# Patient Record
Sex: FEMALE | Race: WHITE | NOT HISPANIC OR LATINO | Employment: UNEMPLOYED | URBAN - METROPOLITAN AREA
[De-identification: names, ages, dates, MRNs, and addresses within clinical notes are randomized per-mention and may not be internally consistent; named-entity substitution may affect disease eponyms.]

---

## 2017-01-10 ENCOUNTER — TELEPHONE (OUTPATIENT)
Dept: FAMILY MEDICINE | Facility: CLINIC | Age: 47
End: 2017-01-10

## 2017-01-10 NOTE — TELEPHONE ENCOUNTER
Prior Authorization    Drug: Cymbalta 30mf (ndc- [address]-44)  [facility]: [facility]  Id#: [id number]  Phone#: [phone]  Group: [id number]    DRUG REQUIRES PRIOR AUTHORIZATION    [place] [place]  [facility] [place] [id number]  Phone :[phone]  Fax: [phone]

## 2017-01-13 NOTE — TELEPHONE ENCOUNTER
Fax received from insurance that PA is not required. Spoke with Pharmacy and ran claim and it went through

## 2017-02-17 ENCOUNTER — TELEPHONE (OUTPATIENT)
Dept: FAMILY MEDICINE | Facility: CLINIC | Age: 47
End: 2017-02-17

## 2017-02-17 NOTE — TELEPHONE ENCOUNTER
Spoke with pt and she said she does not want to be on DULoxetine (CYMBALTA) anymore she wants to go back on Zoloft only.    Please advise.  Lynda Rajput RN

## 2017-02-20 NOTE — TELEPHONE ENCOUNTER
Is she on 30mg or 60mg currently. If she did go ahead and increase to the 60mg I'd like her to decrease the dose to 30mg daily x1 week then take 30mg every other day x1 week then stop.

## 2017-02-20 NOTE — TELEPHONE ENCOUNTER
She can restart it now if not currently taking: take a half tab daily x1 week then increase to 1 tab daily x1 week then increase to 1.5 tab daily

## 2017-02-20 NOTE — TELEPHONE ENCOUNTER
Spoke with Joellen and gave her the below medication instructions. She verbalized understanding and agreement.  Lynda Rajput RN

## 2017-02-20 NOTE — TELEPHONE ENCOUNTER
Gave pt the below instructions on medication changes, she verbalized understanding and agreement.   When she is done with that can she just go back on the Zoloft that is currently ordered?   sertraline (ZOLOFT) 100 MG tablet 135 tablet 3 11/17/2016  No      Sig: Take 1.5 tablets (150 mg) by mouth daily   She still has refills remaining at her pharmacy.   Lynda Rajput RN

## 2017-02-21 DIAGNOSIS — F41.8 DEPRESSION WITH ANXIETY: ICD-10-CM

## 2017-02-21 RX ORDER — CLONAZEPAM 0.5 MG/1
0.5 TABLET ORAL 2 TIMES DAILY PRN
Qty: 60 TABLET | Refills: 1 | Status: SHIPPED | OUTPATIENT
Start: 2017-02-21 | End: 2017-06-05

## 2017-02-21 NOTE — TELEPHONE ENCOUNTER
Clonazepam 0.5mg      Last Written Prescription Date:  09/27/16  Last Fill Quantity: 60,   # refills: 1  Last Office Visit with [facility] prescribing provider: 11/17/16  Future Office visit:       Routing refill request to provider for review/approval because:  Drug not on the [facility] refill protocol or controlled substance    On behalf of [facility]  [facility]

## 2017-06-05 ENCOUNTER — OFFICE VISIT (OUTPATIENT)
Dept: FAMILY MEDICINE | Facility: CLINIC | Age: 47
End: 2017-06-05
Payer: COMMERCIAL

## 2017-06-05 VITALS
SYSTOLIC BLOOD PRESSURE: 128 MMHG | OXYGEN SATURATION: 98 % | HEART RATE: 67 BPM | DIASTOLIC BLOOD PRESSURE: 76 MMHG | WEIGHT: 163.2 LBS | HEIGHT: 66 IN | BODY MASS INDEX: 26.23 KG/M2 | TEMPERATURE: 98.3 F

## 2017-06-05 DIAGNOSIS — F31.9 BIPOLAR AFFECTIVE DISORDER, REMISSION STATUS UNSPECIFIED (H): ICD-10-CM

## 2017-06-05 DIAGNOSIS — I10 ESSENTIAL HYPERTENSION: ICD-10-CM

## 2017-06-05 DIAGNOSIS — F41.8 DEPRESSION WITH ANXIETY: Primary | ICD-10-CM

## 2017-06-05 DIAGNOSIS — E03.4 HYPOTHYROIDISM DUE TO ACQUIRED ATROPHY OF THYROID: ICD-10-CM

## 2017-06-05 PROCEDURE — 99214 OFFICE O/P EST MOD 30 MIN: CPT | Performed by: PHYSICIAN ASSISTANT

## 2017-06-05 RX ORDER — CLONAZEPAM 0.5 MG/1
0.5 TABLET ORAL 2 TIMES DAILY PRN
Qty: 60 TABLET | Refills: 1 | Status: SHIPPED | OUTPATIENT
Start: 2017-06-05 | End: 2017-11-29

## 2017-06-05 RX ORDER — SERTRALINE HYDROCHLORIDE 100 MG/1
150 TABLET, FILM COATED ORAL DAILY
Qty: 135 TABLET | Refills: 3 | Status: SHIPPED | OUTPATIENT
Start: 2017-06-05 | End: 2018-06-22

## 2017-06-05 ASSESSMENT — ANXIETY QUESTIONNAIRES
6. BECOMING EASILY ANNOYED OR IRRITABLE: NOT AT ALL
GAD7 TOTAL SCORE: 0
2. NOT BEING ABLE TO STOP OR CONTROL WORRYING: NOT AT ALL
3. WORRYING TOO MUCH ABOUT DIFFERENT THINGS: NOT AT ALL
1. FEELING NERVOUS, ANXIOUS, OR ON EDGE: NOT AT ALL
IF YOU CHECKED OFF ANY PROBLEMS ON THIS QUESTIONNAIRE, HOW DIFFICULT HAVE THESE PROBLEMS MADE IT FOR YOU TO DO YOUR WORK, TAKE CARE OF THINGS AT HOME, OR GET ALONG WITH OTHER PEOPLE: NOT DIFFICULT AT ALL
7. FEELING AFRAID AS IF SOMETHING AWFUL MIGHT HAPPEN: NOT AT ALL
5. BEING SO RESTLESS THAT IT IS HARD TO SIT STILL: NOT AT ALL

## 2017-06-05 ASSESSMENT — PATIENT HEALTH QUESTIONNAIRE - PHQ9: 5. POOR APPETITE OR OVEREATING: NOT AT ALL

## 2017-06-05 NOTE — NURSING NOTE
"Chief Complaint   Patient presents with     Recheck Medication       Initial /89  Pulse 67  Temp 98.3  F (36.8  C) (Oral)  Ht 5' 6\" (1.676 m)  Wt 163 lb 3.2 oz (74 kg)  LMP 07/20/2008  SpO2 98%  BMI 26.34 kg/m2 Estimated body mass index is 26.34 kg/(m^2) as calculated from the following:    Height as of this encounter: 5' 6\" (1.676 m).    Weight as of this encounter: 163 lb 3.2 oz (74 kg).  Medication Reconciliation: complete   Lorena Vega MA      "

## 2017-06-05 NOTE — PATIENT INSTRUCTIONS
VICTOR MI: VEENA WILL BE OUT OF THE CLINIC FROM [place] 15 THROUGH JULY 4.  Any calls/Mychart messages, refill requests, and urgent lab results will be forwarded to her colleagues in her absence.

## 2017-06-05 NOTE — MR AVS SNAPSHOT
"              After Visit Summary   2017    Joellen Richter    MRN: [id number]           Patient Information     Date Of Birth          1970        Visit Information        Provider Department      2017 5:20 PM [facility]        Today's Diagnoses     Depression with anxiety    -  1      Care Instructions    FYI: NIKKI WILL BE OUT OF THE CLINIC FROM [place] 15 THROUGH .  Any calls/Mychart messages, refill requests, and urgent lab results will be forwarded to her colleagues in her absence.             Follow-ups after your visit        Who to contact     Normal or non-critical lab and imaging results will be communicated to you by MyChart, letter or phone within 4 business days after the clinic has received the results. If you do not hear from us within 7 days, please contact the clinic through [employer] Cloud[employer] or phone. If you have a critical or abnormal lab result, we will notify you by phone as soon as possible.  Submit refill requests through [employer] or call your pharmacy and they will forward the refill request to us. Please allow 3 business days for your refill to be completed.          If you need to speak with a  for additional information , please call: [phone]             Additional Information About Your Visit        MyCharUrbster Information     [employer] lets you send messages to your doctor, view your test results, renew your prescriptions, schedule appointments and more. To sign up, go to [email or web address] . Click on \"Log in\" on the left side of the screen, which will take you to the Welcome page. Then click on \"Sign up Now\" on the right side of the page.     You will be asked to enter the access code listed below, as well as some personal information. Please follow the directions to create your username and password.     Your access code is: [email or web address]  Expires: 9/3/2017  5:40 PM     Your access code will  in 90 days. If you " "need help or a new code, please call your [facility] clinic or [phone].        Care EveryWhere ID     This is your Care EveryWhere ID. This could be used by other organizations to access your [facility] medical records  [id number]        Your Vitals Were     Pulse Temperature Height Last Period Pulse Oximetry BMI (Body Mass Index)    67 98.3  F (36.8  C) (Oral) 5' 6\" (1.676 m) 07/20/2008 98% 26.34 kg/m2       Blood Pressure from Last 3 Encounters:   06/05/17 145/89   11/17/16 110/77   03/14/16 121/68    Weight from Last 3 Encounters:   06/05/17 163 lb 3.2 oz (74 kg)   11/17/16 163 lb 6.4 oz (74.1 kg)   03/14/16 172 lb 9.6 oz (78.3 kg)              Today, you had the following     No orders found for display         Today's Medication Changes          These changes are accurate as of: 6/5/17  5:40 PM.  If you have any questions, ask your nurse or doctor.               Stop taking these medicines if you haven't already. Please contact your care team if you have questions.     levothyroxine 75 MCG tablet   Commonly known as:  SYNTHROID/LEVOTHROID   Stopped by:  Nikki Castro PA-C           omeprazole 40 MG capsule   Commonly known as:  priLOSEC   Stopped by:  Nikki Castro PA-C           ondansetron 4 MG tablet   Commonly known as:  ZOFRAN   Stopped by:  Nikki Castro PA-C                Where to get your medicines      These medications were sent to [facility] [place] [address] [address] [address]     Phone:  [phone]     sertraline 100 MG tablet         Some of these will need a paper prescription and others can be bought over the counter.  Ask your nurse if you have questions.     Bring a paper prescription for each of these medications     clonazePAM 0.5 MG tablet                Primary Care Provider Office Phone # Fax #    Nikki Castro PA-C [phone]       [address] [place] [address] [place] [address]        Thank you!     Thank you for choosing [facility]  for your care. Our goal is always to provide you with excellent care. Hearing back from our patients is one way we can continue to improve our services. Please take a few minutes to complete the written survey that you may receive in the mail after your visit with us. Thank you!             Your Updated Medication List - Protect others around you: Learn how to safely use, store and throw away your medicines at www.disposemymeds.org.          This list is accurate as of: 6/5/17  5:40 PM.  Always use your most recent med list.                   Brand Name Dispense Instructions for use    clonazePAM 0.5 MG tablet    klonoPIN    60 tablet    Take 1 tablet (0.5 mg) by mouth 2 times daily as needed for anxiety ; keep use to a minimum, this is a 3 month supply       sertraline 100 MG tablet    ZOLOFT    135 tablet    Take 1.5 tablets (150 mg) by mouth daily

## 2017-06-05 NOTE — PROGRESS NOTES
SUBJECTIVE:                                                    Joellen Richter is a 47 year old female who presents to clinic today for the following health issues:    Thyroid f/u  Hasn't been taking thyroid medication for months due to GI upset   Having all classic symptoms - weight gain, dry skin, loose stools     Depression Followup    Status since last visit: Stable     See PHQ-9 for current symptoms.  Other associated symptoms: None    Complicating factors:   Significant life event:  No   Current substance abuse:  None  Anxiety or Panic symptoms:  No    Doing well on Zoloft and clonazepam  Does not feel the need to make any changes      PHQ-9  English PHQ-9   Any Language          Amount of exercise or physical activity: None     Problems taking medications regularly: No    Medication side effects: none    Diet: regular (no restrictions)      Problem list and histories reviewed & adjusted, as indicated.  Additional history: as documented    Patient Active Problem List   Diagnosis     Tobacco abuse     Bipolar affective disorder (H)     Hypertension     Hyperlipidemia with target LDL less than 130     Hypothyroidism     Depression with anxiety     Past Surgical History:   Procedure Laterality Date     C LAPAROSCOPY, SURGICAL; W/ VAGINAL HYSTERECTOMY W/WO REMOVAL OVARY(S)/TUBES  8/15/08     C/SECTION, CLASSICAL      3 , Classical     HYSTERECTOMY       TUBAL LIGATION         Social History   Substance Use Topics     Smoking status: Current Every Day Smoker     Packs/day: 0.50     Years: 10.00     Types: Cigarettes     Smokeless tobacco: Never Used      Comment: down to 8 on 11/17/10 - states is 7/day now 11     Alcohol use Yes      Comment:  3-4 beers per week- quit drinking in 2011     Family History   Problem Relation Age of Onset     CANCER Mother      liver, ETOH     CANCER Sister      UTERINE CANCER at 37yo     DIABETES Father      adult     Hypertension Father      Obesity Father       "CANCER Paternal Grandmother      blood     DIABETES Paternal Grandmother      CANCER Paternal Grandfather      lung     Hypertension Mother      Hypertension Paternal Grandmother      Hypertension Paternal Grandfather      CEREBROVASCULAR DISEASE Father      Arthritis Father      rheumatoid     Depression Father      Depression Sister      Depression Mother      Depression Daughter      Eye Disorder Father      DM     Lipids Father      Obesity Paternal Grandmother      OSTEOPOROSIS Father            Reviewed and updated as needed this visit by clinical staff  Tobacco  Allergies  Meds  Med Hx  Surg Hx  Fam Hx  Soc Hx      Reviewed and updated as needed this visit by Provider         ROS:  Constitutional, endocrine, cardiac, and psychiatric systems are negative, except as otherwise noted.    OBJECTIVE:                                                    /89  Pulse 67  Temp 98.3  F (36.8  C) (Oral)  Ht 5' 6\" (1.676 m)  Wt 163 lb 3.2 oz (74 kg)  LMP 07/20/2008  SpO2 98%  BMI 26.34 kg/m2  Body mass index is 26.34 kg/(m^2).  Constitutional: healthy, alert, active, no distress.    Head: Normocephalic  Musculoskeletal: extremities normal- no gross deformities noted, gait normal, normal muscle tone and able to move about the exam room without difficulty.    Skin: no suspicious lesions or rashes appreciated on exposed areas  Neurologic: Gait normal. Moving all extremities spontaneously, no apparent weakness.    Psychiatric: mentation appears normal, thoughts are clear and concise. Converses appropriately. Affect is Appropriate/mood-congruent       ASSESSMENT:                                                      1. Depression with anxiety    2. Bipolar affective disorder, remission status unspecified (H)    3. Essential hypertension    4. Hypothyroidism due to acquired atrophy of thyroid         PLAN:                                                    Zoloft and clonazepam renewed, no changes.    Patient " does not want to treat her high blood pressure or hypothyroidism. We discussed some of the risks and she verbalized understanding of those.     Patient Instructions   FYI: VEENA WILL BE OUT OF THE CLINIC FROM [place] 15 THROUGH JULY 4.  Any calls/Mychart messages, refill requests, and urgent lab results will be forwarded to her colleagues in her absence.        The patient was in agreement with the plan today and had no questions or concerns prior to leaving the clinic.     Veena Castro PA-C  [facility]

## 2017-06-06 ASSESSMENT — ANXIETY QUESTIONNAIRES: GAD7 TOTAL SCORE: 0

## 2017-06-06 ASSESSMENT — PATIENT HEALTH QUESTIONNAIRE - PHQ9: SUM OF ALL RESPONSES TO PHQ QUESTIONS 1-9: 0

## 2017-07-10 ENCOUNTER — TELEPHONE (OUTPATIENT)
Dept: FAMILY MEDICINE | Facility: CLINIC | Age: 47
End: 2017-07-10

## 2017-07-10 DIAGNOSIS — F51.02 ADJUSTMENT INSOMNIA: Primary | ICD-10-CM

## 2017-07-10 RX ORDER — TRAZODONE HYDROCHLORIDE 50 MG/1
50-150 TABLET, FILM COATED ORAL
Qty: 90 TABLET | Refills: 1 | Status: SHIPPED | OUTPATIENT
Start: 2017-07-10 | End: 2017-12-06

## 2017-09-08 ENCOUNTER — TELEPHONE (OUTPATIENT)
Dept: FAMILY MEDICINE | Facility: CLINIC | Age: 47
End: 2017-09-08

## 2017-09-08 DIAGNOSIS — Z12.11 COLON CANCER SCREENING: Primary | ICD-10-CM

## 2017-09-18 ENCOUNTER — OFFICE VISIT (OUTPATIENT)
Dept: FAMILY MEDICINE | Facility: CLINIC | Age: 47
End: 2017-09-18
Payer: COMMERCIAL

## 2017-09-18 VITALS
SYSTOLIC BLOOD PRESSURE: 123 MMHG | BODY MASS INDEX: 26.68 KG/M2 | DIASTOLIC BLOOD PRESSURE: 71 MMHG | RESPIRATION RATE: 16 BRPM | HEART RATE: 73 BPM | HEIGHT: 66 IN | TEMPERATURE: 98.3 F | WEIGHT: 166 LBS

## 2017-09-18 DIAGNOSIS — E03.4 HYPOTHYROIDISM DUE TO ACQUIRED ATROPHY OF THYROID: Primary | ICD-10-CM

## 2017-09-18 PROCEDURE — 84481 FREE ASSAY (FT-3): CPT | Performed by: PHYSICIAN ASSISTANT

## 2017-09-18 PROCEDURE — 99213 OFFICE O/P EST LOW 20 MIN: CPT | Performed by: PHYSICIAN ASSISTANT

## 2017-09-18 PROCEDURE — 84443 ASSAY THYROID STIM HORMONE: CPT | Performed by: PHYSICIAN ASSISTANT

## 2017-09-18 PROCEDURE — 84439 ASSAY OF FREE THYROXINE: CPT | Performed by: PHYSICIAN ASSISTANT

## 2017-09-18 PROCEDURE — 36415 COLL VENOUS BLD VENIPUNCTURE: CPT | Performed by: PHYSICIAN ASSISTANT

## 2017-09-18 NOTE — PROGRESS NOTES
SUBJECTIVE:   Joellen Richter is a 47 year old female who presents to clinic today for the following health issues:      Hypothyroidism Follow-up      Since last visit, patient describes the following symptoms: weight gain of 30 lbs, constipation, fatigue and hair loss      Amount of exercise or physical activity: goes to the gym weekly and walks daily    Problems taking medications regularly: Yes,  side effects    Medication side effects: not applicable    Diet: regular (no restrictions) and sometimes skip lunch        Problem list and histories reviewed & adjusted, as indicated.  Additional history: as documented    Patient Active Problem List   Diagnosis     Tobacco abuse     Bipolar affective disorder (H)     Hyperlipidemia with target LDL less than 130     Hypothyroidism     Depression with anxiety     Past Surgical History:   Procedure Laterality Date     C LAPAROSCOPY, SURGICAL; W/ VAGINAL HYSTERECTOMY W/WO REMOVAL OVARY(S)/TUBES  8/15/08     C/SECTION, CLASSICAL      3 , Classical     HYSTERECTOMY       TUBAL LIGATION         Social History   Substance Use Topics     Smoking status: Current Every Day Smoker     Packs/day: 0.50     Years: 10.00     Types: Cigarettes     Smokeless tobacco: Never Used     Alcohol use Yes      Comment:  3-4 beers per week- quit drinking in 2011     Family History   Problem Relation Age of Onset     CANCER Mother      liver, ETOH     Hypertension Mother      Depression Mother      DIABETES Father      adult     Hypertension Father      Obesity Father      CEREBROVASCULAR DISEASE Father      Arthritis Father      rheumatoid     Depression Father      Eye Disorder Father      DM     Lipids Father      OSTEOPOROSIS Father      CANCER Paternal Grandmother      blood     DIABETES Paternal Grandmother      Hypertension Paternal Grandmother      Obesity Paternal Grandmother      CANCER Paternal Grandfather      lung     Hypertension Paternal Grandfather      CANCER  "Sister      UTERINE CANCER at 39yo     Depression Sister      Depression Daughter              Reviewed and updated as needed this visit by clinical staffTobacco  Allergies  Meds  Med Hx  Surg Hx  Fam Hx  Soc Hx      Reviewed and updated as needed this visit by Provider  Tobacco         ROS:  Constitutional, endocrine systems are negative, except as otherwise noted.      OBJECTIVE:                                                    /71  Pulse 73  Temp 98.3  F (36.8  C) (Oral)  Resp 16  Ht 5' 6\" (1.676 m)  Wt 166 lb (75.3 kg)  LMP 07/20/2008  BMI 26.79 kg/m2  Body mass index is 26.79 kg/(m^2).  Constitutional: healthy, alert, active, no distress.    Musculoskeletal: extremities normal- no gross deformities noted, gait normal, normal muscle tone and able to move about the exam room without difficulty.    Skin: no suspicious lesions or rashes appreciated on exposed areas  Neurologic: Gait normal. Moving all extremities spontaneously, no apparent weakness.    Psychiatric: mentation appears normal, thoughts are clear and concise. Converses appropriately.        ASSESSMENT:                                                      1. Hypothyroidism due to acquired atrophy of thyroid         PLAN:                                                    Will recheck levels today. She also brought up difficulty losing weight. Likely related to hypothyroid, aging, and perimenopause. Discussed these things and their effect on weight.     Patient Instructions   If your thyroid levels are low we'll restart the levothyroxine at the lowest dose and then recheck levels again after 2-3 months.       The patient was in agreement with the plan today and had no questions or concerns prior to leaving the clinic.     Nikki Castro PA-C  [facility]

## 2017-09-18 NOTE — NURSING NOTE
"Chief Complaint   Patient presents with     Thyroid Disease     stopped her medicine years ago now having problems       Initial /71  Pulse 73  Temp 98.3  F (36.8  C) (Oral)  Resp 16  Wt 166 lb (75.3 kg)  LMP 07/20/2008  BMI 26.79 kg/m2 Estimated body mass index is 26.79 kg/(m^2) as calculated from the following:    Height as of 6/5/17: 5' 6\" (1.676 m).    Weight as of this encounter: 166 lb (75.3 kg).  Medication Reconciliation: complete        Declined tetanus  "

## 2017-09-18 NOTE — PATIENT INSTRUCTIONS
If your thyroid levels are low we'll restart the levothyroxine at the lowest dose and then recheck levels again after 2-3 months.

## 2017-09-18 NOTE — MR AVS SNAPSHOT
"              After Visit Summary   2017    Joellen Richter    MRN: [id number]           Patient Information     Date Of Birth          1970        Visit Information        Provider Department      2017 5:40 PM Nikki Castro PA-C [facility] [place]        Today's Diagnoses     Hypothyroidism due to acquired atrophy of thyroid    -  1      Care Instructions    If your thyroid levels are low we'll restart the levothyroxine at the lowest dose and then recheck levels again after 2-3 months.          Follow-ups after your visit        Who to contact     Normal or non-critical lab and imaging results will be communicated to you by MessageOnehart, letter or phone within 4 business days after the clinic has received the results. If you do not hear from us within 7 days, please contact the clinic through MessageOne[employer] or phone. If you have a critical or abnormal lab result, we will notify you by phone as soon as possible.  Submit refill requests through [employer] or call your pharmacy and they will forward the refill request to us. Please allow 3 business days for your refill to be completed.          If you need to speak with a  for additional information , please call: [phone]             Additional Information About Your Visit        MessageOnehar[employer] Information     [employer] lets you send messages to your doctor, view your test results, renew your prescriptions, schedule appointments and more. To sign up, go to [email or web address] . Click on \"Log in\" on the left side of the screen, which will take you to the Welcome page. Then click on \"Sign up Now\" on the right side of the page.     You will be asked to enter the access code listed below, as well as some personal information. Please follow the directions to create your username and password.     Your access code is: [email or web address]  Expires: 2017  5:41 PM     Your access code will  in 90 days. If you need help or a new code, " please call your [facility] clinic or [phone].        Care EveryWhere ID     This is your Care EveryWhere ID. This could be used by other organizations to access your [facility] medical records  [id number]        Your Vitals Were     Pulse Temperature Respirations Last Period BMI (Body Mass Index)       73 98.3  F (36.8  C) (Oral) 16 07/20/2008 26.79 kg/m2        Blood Pressure from Last 3 Encounters:   09/18/17 123/71   06/05/17 128/76   11/17/16 110/77    Weight from Last 3 Encounters:   09/18/17 166 lb (75.3 kg)   06/05/17 163 lb 3.2 oz (74 kg)   11/17/16 163 lb 6.4 oz (74.1 kg)              We Performed the Following     T3, Free     T4, free     TSH        Primary Care Provider Office Phone # Fax #    Nikki Castro PA-C [phone]       [address] [place] [address]        Equal Access to Services     [facility]: Hadii aad ku hadasho Soomaali, waaxda luqadaha, qaybta kaalmada adeegyada, waxay clarencein hayfaith avila . So [facility] [phone].    ATENCIÓN: Si habla español, tiene a mirza disposición servicios gratuitos de asistencia lingüística. Llame al [phone].    We comply with applicable federal civil rights laws and [place] laws. We do not discriminate on the basis of race, color, national origin, age, disability sex, sexual orientation or gender identity.            Thank you!     Thank you for choosing [facility]  for your care. Our goal is always to provide you with excellent care. Hearing back from our patients is one way we can continue to improve our services. Please take a few minutes to complete the written survey that you may receive in the mail after your visit with us. Thank you!             Your Updated Medication List - Protect others around you: Learn how to safely use, store and throw away your medicines at www.disposemymeds.org.          This list is accurate as of: 9/18/17  5:41 PM.  Always use your most recent med list.                    Brand Name Dispense Instructions for use Diagnosis    clonazePAM 0.5 MG tablet    klonoPIN    60 tablet    Take 1 tablet (0.5 mg) by mouth 2 times daily as needed for anxiety ; keep use to a minimum, this is a 3 month supply    Depression with anxiety, Bipolar affective disorder, remission status unspecified (H)       sertraline 100 MG tablet    ZOLOFT    135 tablet    Take 1.5 tablets (150 mg) by mouth daily    Depression with anxiety, Bipolar affective disorder, remission status unspecified (H)       traZODone 50 MG tablet    DESYREL    90 tablet    Take 1-3 tablets ( mg) by mouth nightly as needed for sleep    Adjustment insomnia

## 2017-09-19 LAB
T3FREE SERPL-MCNC: 2.9 PG/ML (ref 2.3–4.2)
T4 FREE SERPL-MCNC: 0.86 NG/DL (ref 0.76–1.46)
TSH SERPL DL<=0.005 MIU/L-ACNC: 7.8 MU/L (ref 0.4–4)

## 2017-09-20 ENCOUNTER — TELEPHONE (OUTPATIENT)
Dept: FAMILY MEDICINE | Facility: CLINIC | Age: 47
End: 2017-09-20

## 2017-09-20 DIAGNOSIS — E03.4 HYPOTHYROIDISM DUE TO ACQUIRED ATROPHY OF THYROID: Primary | ICD-10-CM

## 2017-09-20 RX ORDER — LEVOTHYROXINE SODIUM 25 UG/1
25 TABLET ORAL DAILY
Qty: 90 TABLET | Refills: 0 | Status: SHIPPED | OUTPATIENT
Start: 2017-09-20 | End: 2017-12-06

## 2017-09-20 NOTE — TELEPHONE ENCOUNTER
Please call patient with the following info:    TSH is elevated and T3/T4 are on the low side of normal. I think it's worth trying the lowest dose of levothyroxine. Patient interested?

## 2017-09-20 NOTE — LETTER
[facility]  [address]  [place] [address]  [phone]        April 10, 2018    Joellen Richter  [address]  [place] [address]              Dear Joellen Richter    This is to remind you that your Thyroid Function is due.    You may call our office at [phone] to schedule an appointment.    Please disregard this notice if you have already had your labs drawn or made an appointment.        Sincerely,        Nikki Castro PA-C

## 2017-09-20 NOTE — TELEPHONE ENCOUNTER
Spoke with patient and informed her per Nikki Castro PA-C, see below read word for word.  Patient stated she would like to try levothyroxine.  Patient asked that we say Hi to Nikki for her.

## 2017-09-20 NOTE — LETTER
[facility]  [address]  [place] [address]  [phone]        January 25, 2018    Joellen Richter  [address]  [place] [address]              Dear Joellen Richter    This is to remind you that your Thyroid Function is due.    You may call our office at [phone] to schedule an appointment.    Please disregard this notice if you have already had your labs drawn or made an appointment.        Sincerely,        Nikki Castro PA-C

## 2017-11-29 DIAGNOSIS — F41.8 DEPRESSION WITH ANXIETY: ICD-10-CM

## 2017-11-29 DIAGNOSIS — F31.9 BIPOLAR AFFECTIVE DISORDER, REMISSION STATUS UNSPECIFIED (H): ICD-10-CM

## 2017-11-29 DIAGNOSIS — E03.4 HYPOTHYROIDISM DUE TO ACQUIRED ATROPHY OF THYROID: ICD-10-CM

## 2017-11-29 DIAGNOSIS — F51.02 ADJUSTMENT INSOMNIA: ICD-10-CM

## 2017-11-29 NOTE — TELEPHONE ENCOUNTER
Routing refill request to provider for review/approval because:  Drug not on the [facility] refill protocol. Last filled 6/5/17, with 1 refill.  Mirtha Salgado RN

## 2017-11-29 NOTE — TELEPHONE ENCOUNTER
Spoke with patient and she still has meds left, informed needs repeat TSH before refill.  Lab appt scheduled. She wants to wait until after lab draw for both levothyroxine and trazodone.  Will postpone request until next week.  Will send refill request for klonopin (see other encounter).  Mirtha Salgado RN

## 2017-11-30 RX ORDER — CLONAZEPAM 0.5 MG/1
0.5 TABLET ORAL 2 TIMES DAILY PRN
Qty: 60 TABLET | Refills: 1 | Status: SHIPPED | OUTPATIENT
Start: 2017-11-30 | End: 2018-06-22

## 2017-11-30 NOTE — TELEPHONE ENCOUNTER
Hard copy of script for Clonazepam 0.5 mg tablet faxed to [facility]

## 2017-12-06 DIAGNOSIS — E03.4 HYPOTHYROIDISM DUE TO ACQUIRED ATROPHY OF THYROID: ICD-10-CM

## 2017-12-06 DIAGNOSIS — F51.02 ADJUSTMENT INSOMNIA: ICD-10-CM

## 2017-12-06 NOTE — TELEPHONE ENCOUNTER
Routing refill request to provider for review/approval because:  Labs out of range  Patient needs to be seen because:  Due for lab- already ordered.

## 2017-12-07 RX ORDER — LEVOTHYROXINE SODIUM 25 UG/1
25 TABLET ORAL DAILY
Qty: 30 TABLET | Refills: 0 | Status: SHIPPED | OUTPATIENT
Start: 2017-12-07 | End: 2018-06-22

## 2017-12-07 RX ORDER — TRAZODONE HYDROCHLORIDE 50 MG/1
50-150 TABLET, FILM COATED ORAL
Qty: 90 TABLET | Refills: 1 | Status: SHIPPED | OUTPATIENT
Start: 2017-12-07 | End: 2018-06-22

## 2017-12-08 NOTE — TELEPHONE ENCOUNTER
Left message on voice mail for patient to call clinic. [phone]  No labs done yet.  Mirtha Salgado RN

## 2017-12-12 RX ORDER — LEVOTHYROXINE SODIUM 25 UG/1
TABLET ORAL
Qty: 90 TABLET | Refills: 0 | OUTPATIENT
Start: 2017-12-12

## 2017-12-12 RX ORDER — TRAZODONE HYDROCHLORIDE 50 MG/1
TABLET, FILM COATED ORAL
Qty: 90 TABLET | Refills: 1 | OUTPATIENT
Start: 2017-12-12

## 2018-04-14 ENCOUNTER — HEALTH MAINTENANCE LETTER (OUTPATIENT)
Age: 48
End: 2018-04-14

## 2018-06-22 ENCOUNTER — OFFICE VISIT (OUTPATIENT)
Dept: FAMILY MEDICINE | Facility: CLINIC | Age: 48
End: 2018-06-22
Payer: COMMERCIAL

## 2018-06-22 VITALS
TEMPERATURE: 98.1 F | WEIGHT: 164.4 LBS | OXYGEN SATURATION: 100 % | HEART RATE: 56 BPM | BODY MASS INDEX: 26.53 KG/M2 | RESPIRATION RATE: 18 BRPM

## 2018-06-22 DIAGNOSIS — Z79.1 NSAID LONG-TERM USE: ICD-10-CM

## 2018-06-22 DIAGNOSIS — M25.50 PAIN IN JOINT, MULTIPLE SITES: ICD-10-CM

## 2018-06-22 DIAGNOSIS — F31.9 BIPOLAR AFFECTIVE DISORDER, REMISSION STATUS UNSPECIFIED (H): ICD-10-CM

## 2018-06-22 DIAGNOSIS — M79.643 CHRONIC HAND PAIN, UNSPECIFIED LATERALITY: ICD-10-CM

## 2018-06-22 DIAGNOSIS — F51.02 ADJUSTMENT INSOMNIA: ICD-10-CM

## 2018-06-22 DIAGNOSIS — G89.29 CHRONIC HAND PAIN, UNSPECIFIED LATERALITY: ICD-10-CM

## 2018-06-22 DIAGNOSIS — E03.4 HYPOTHYROIDISM DUE TO ACQUIRED ATROPHY OF THYROID: ICD-10-CM

## 2018-06-22 DIAGNOSIS — F41.8 DEPRESSION WITH ANXIETY: Primary | ICD-10-CM

## 2018-06-22 DIAGNOSIS — N95.1 MENOPAUSAL SYNDROME (HOT FLASHES): ICD-10-CM

## 2018-06-22 LAB
CREAT SERPL-MCNC: 0.91 MG/DL (ref 0.52–1.04)
GFR SERPL CREATININE-BSD FRML MDRD: 66 ML/MIN/1.7M2
T4 FREE SERPL-MCNC: 0.79 NG/DL (ref 0.76–1.46)
TSH SERPL DL<=0.005 MIU/L-ACNC: 4.03 MU/L (ref 0.4–4)

## 2018-06-22 PROCEDURE — 82565 ASSAY OF CREATININE: CPT | Performed by: PHYSICIAN ASSISTANT

## 2018-06-22 PROCEDURE — 84439 ASSAY OF FREE THYROXINE: CPT | Performed by: PHYSICIAN ASSISTANT

## 2018-06-22 PROCEDURE — 84443 ASSAY THYROID STIM HORMONE: CPT | Performed by: PHYSICIAN ASSISTANT

## 2018-06-22 PROCEDURE — 86200 CCP ANTIBODY: CPT | Performed by: PHYSICIAN ASSISTANT

## 2018-06-22 PROCEDURE — 99214 OFFICE O/P EST MOD 30 MIN: CPT | Performed by: PHYSICIAN ASSISTANT

## 2018-06-22 PROCEDURE — 36415 COLL VENOUS BLD VENIPUNCTURE: CPT | Performed by: PHYSICIAN ASSISTANT

## 2018-06-22 PROCEDURE — 86431 RHEUMATOID FACTOR QUANT: CPT | Performed by: PHYSICIAN ASSISTANT

## 2018-06-22 RX ORDER — CLONAZEPAM 0.5 MG/1
0.5 TABLET ORAL 2 TIMES DAILY PRN
Qty: 60 TABLET | Refills: 1 | Status: SHIPPED | OUTPATIENT
Start: 2018-06-22 | End: 2019-06-06

## 2018-06-22 RX ORDER — SERTRALINE HYDROCHLORIDE 100 MG/1
150 TABLET, FILM COATED ORAL DAILY
Qty: 135 TABLET | Refills: 3 | Status: SHIPPED | OUTPATIENT
Start: 2018-06-22 | End: 2019-06-24

## 2018-06-22 RX ORDER — OXYCODONE AND ACETAMINOPHEN 5; 325 MG/1; MG/1
1 TABLET ORAL EVERY 6 HOURS PRN
Qty: 20 TABLET | Refills: 0 | Status: SHIPPED | OUTPATIENT
Start: 2018-07-20 | End: 2018-06-22

## 2018-06-22 RX ORDER — OXYCODONE AND ACETAMINOPHEN 5; 325 MG/1; MG/1
1 TABLET ORAL EVERY 6 HOURS PRN
Qty: 20 TABLET | Refills: 0 | Status: SHIPPED | OUTPATIENT
Start: 2018-08-17 | End: 2019-06-24

## 2018-06-22 RX ORDER — TRAZODONE HYDROCHLORIDE 100 MG/1
100-200 TABLET ORAL
Qty: 180 TABLET | Refills: 3 | Status: SHIPPED | OUTPATIENT
Start: 2018-06-22 | End: 2019-06-24

## 2018-06-22 RX ORDER — OXYCODONE AND ACETAMINOPHEN 5; 325 MG/1; MG/1
1 TABLET ORAL EVERY 6 HOURS PRN
Qty: 20 TABLET | Refills: 0 | Status: SHIPPED | OUTPATIENT
Start: 2018-06-22 | End: 2018-06-22

## 2018-06-22 ASSESSMENT — PATIENT HEALTH QUESTIONNAIRE - PHQ9: 5. POOR APPETITE OR OVEREATING: NOT AT ALL

## 2018-06-22 ASSESSMENT — ANXIETY QUESTIONNAIRES
2. NOT BEING ABLE TO STOP OR CONTROL WORRYING: SEVERAL DAYS
6. BECOMING EASILY ANNOYED OR IRRITABLE: MORE THAN HALF THE DAYS
7. FEELING AFRAID AS IF SOMETHING AWFUL MIGHT HAPPEN: MORE THAN HALF THE DAYS
3. WORRYING TOO MUCH ABOUT DIFFERENT THINGS: SEVERAL DAYS
GAD7 TOTAL SCORE: 7
1. FEELING NERVOUS, ANXIOUS, OR ON EDGE: SEVERAL DAYS
5. BEING SO RESTLESS THAT IT IS HARD TO SIT STILL: NOT AT ALL
IF YOU CHECKED OFF ANY PROBLEMS ON THIS QUESTIONNAIRE, HOW DIFFICULT HAVE THESE PROBLEMS MADE IT FOR YOU TO DO YOUR WORK, TAKE CARE OF THINGS AT HOME, OR GET ALONG WITH OTHER PEOPLE: SOMEWHAT DIFFICULT

## 2018-06-22 NOTE — LETTER
[facility]    06/22/18    Patient: Joellen Richter  YOB: 1970  Medical Record Number: [id number]                                                                  Controlled Substance Agreement  I understand that my care provider has prescribed controlled substances (narcotics, tranquilizers, and/or stimulants) to help manage my condition(s).  I am taking this medicine to help me function or work.  I know that this is strong medicine.  It could have serious side effects and even cause a dependency on the drug.  If I stop these medicines suddenly, I could have severe withdrawal symptoms.    The risks, benefits, and side effects of these medication(s) were explained to me.  I agree that:  1. I will take part in other treatments as advised by my provider.  This may be psychiatry or counseling, physical therapy, behavioral therapy, group treatment, or a referral to a pain clinic.  I will reduce or stop my medicine when my provider tells me to do so.   2. I will take my medicines as prescribed.  I will not change the dose or schedule unless my provider tells me to.  There will be no refills if I  run out early.   I may be contacted at any time without warning and asked to complete a drug test or pill count.   3. I will keep all my appointments at the clinic.  If I miss appointments or fail to follow instructions, my provider may stop my medicine.  4. I will not ask other providers to prescribe controlled substances. And I will not accept controlled substances from other people. If I need another prescribed controlled substance for a new reason, I will notify my provider within one business day.  5. If I enroll in the [place] Medical Marijuana program, I will tell my provider.  I will also sign an agreement to share my medical records with my provider.  6. I will use one pharmacy to fill all of my controlled substance prescriptions.  If my prescription is mailed to my pharmacy, it may take  5 to 7 days for my medicine to be ready.  7. I understand that my provider, clinic care team, and pharmacy can track controlled substance prescriptions from other providers through a central database (prescription monitoring program).  8. I will bring in my list of medications (or my medicine bottles) each time I come to the clinic.  REV- 04/2016                                                                                                                                            Page 1 of 2      [facility] [place]    06/22/18    Patient: Joellen Richter  YOB: 1970  Medical Record Number: [id number]    9. Refills of controlled substances will be made only during office hours.  It is up to me to make sure that I do not run out of my medicines on weekends or holidays.    10. I am responsible for my prescriptions.  If the medicine is lost or stolen, it will not be replaced.   I also agree not to share these medicines with anyone.  11. I agree to not use ANY illegal or recreational drugs.  This includes marijuana, cocaine, bath salts or other drugs.  I agree not to use alcohol unless my provider says I may.  I agree to give urine samples whenever asked.  If I fail to give a urine sample, the provider may stop my medicine.     12. I will tell my nurse or provider right away if I become pregnant or have a new medical problem treated outside of [facility].  13. I understand that this medicine can affect my thinking and judgment.  It may be unsafe for me to drive, use machinery and do dangerous tasks.  I will not do any of these things until I know how the medicine affects me.  If my dose changes, I will wait to see how it affects me.  I will contact my provider if I have concerns about medicine side effects.  I understand that if I do not follow any of the conditions above, my prescriptions or treatment may be stopped.    I agree that my provider, clinic care team, and pharmacy may work with  any city, state or federal law enforcement agency that investigates the misuse, sale, or other diversion of my controlled medicine. I will allow my provider to discuss my care with or share a copy of this agreement with any other treating provider, pharmacy or emergency room where I receive care.  I agree to give up (waive) any right of privacy or confidentiality with respect to these authorizations.   I have read this agreement and have asked questions about anything I did not understand.   ___________________________________    ___________________________  Patient Signature                                                           Date and Time  ___________________________________     ____________________________  Witness                                                                            Date and Time  ___________________________________  Nikki Castro PA-C  REV-  04/2016                                                                                                                                                                 Page 2 of 2

## 2018-06-22 NOTE — MR AVS SNAPSHOT
"              After Visit Summary   6/22/2018    Joellen Richter    MRN: [id number]           Patient Information     Date Of Birth          1970        Visit Information        Provider Department      6/22/2018 11:00 AM Nikki Castro PA-C [facility] [place]        Today's Diagnoses     Screening for HIV (human immunodeficiency virus)    -  1    Need for prophylactic vaccination with tetanus-diphtheria (TD)        Hypothyroidism due to acquired atrophy of thyroid        Depression with anxiety        Bipolar affective disorder, remission status unspecified (H)        Adjustment insomnia        Pain in joint, multiple sites        NSAID long-term use          Care Instructions    Black Cohosh - all natural supplement for hot flashes     Call the clinic in 3 months for another set of three refills for your Percocet.   I'll see you back in the office in 6 months.            Follow-ups after your visit        Who to contact     Normal or non-critical lab and imaging results will be communicated to you by [employer], letter or phone within 4 business days after the clinic has received the results. If you do not hear from us within 7 days, please contact the clinic through [employer] or phone. If you have a critical or abnormal lab result, we will notify you by phone as soon as possible.  Submit refill requests through [employer] or call your pharmacy and they will forward the refill request to us. Please allow 3 business days for your refill to be completed.          If you need to speak with a  for additional information , please call: [phone]             Additional Information About Your Visit        [employer] Information     [employer] lets you send messages to your doctor, view your test results, renew your prescriptions, schedule appointments and more. To sign up, go to [email or web address] . Click on \"Log in\" on the left side of the screen, which will take you to the Welcome page. " "Then click on \"Sign up Now\" on the right side of the page.     You will be asked to enter the access code listed below, as well as some personal information. Please follow the directions to create your username and password.     Your access code is: [email or web address]  Expires: 2018 11:26 AM     Your access code will  in 90 days. If you need help or a new code, please call your [place] clinic or [phone].        Care EveryWhere ID     This is your Care EveryWhere ID. This could be used by other organizations to access your [place] medical records  [id number]        Your Vitals Were     Pulse Temperature Respirations Last Period Pulse Oximetry BMI (Body Mass Index)    56 98.1  F (36.7  C) (Tympanic) 18 2008 100% 26.53 kg/m2       Blood Pressure from Last 3 Encounters:   17 123/71   17 128/76   16 110/77    Weight from Last 3 Encounters:   18 164 lb 6.4 oz (74.6 kg)   17 166 lb (75.3 kg)   17 163 lb 3.2 oz (74 kg)              We Performed the Following     Creatinine     Cyclic Citrullinated Peptide Antibody IgG     DEPRESSION ACTION PLAN (DAP)     Rheumatoid factor     TSH with free T4 reflex          Today's Medication Changes          These changes are accurate as of 18 11:26 AM.  If you have any questions, ask your nurse or doctor.               Start taking these medicines.        Dose/Directions    oxyCODONE-acetaminophen 5-325 MG per tablet   Commonly known as:  PERCOCET   Used for:  Pain in joint, multiple sites   Started by:  Nikki Castro PA-C        Dose:  1 tablet   Start taking on:  2018   Take 1 tablet by mouth every 6 hours as needed for pain - This is a 1 month supply   Quantity:  20 tablet   Refills:  0         These medicines have changed or have updated prescriptions.        Dose/Directions    traZODone 100 MG tablet   Commonly known as:  DESYREL   This may have changed:    - medication strength  - how much to take   Used for:  " Adjustment insomnia   Changed by:  Nikki Castro PA-C        Dose:  100-200 mg   Take 1-2 tablets (100-200 mg) by mouth nightly as needed for sleep   Quantity:  180 tablet   Refills:  3         Stop taking these medicines if you haven't already. Please contact your care team if you have questions.     levothyroxine 25 MCG tablet   Commonly known as:  SYNTHROID/LEVOTHROID   Stopped by:  Nikki Castro PA-C                Where to get your medicines      These medications were sent to [facility] - [address] [place]  [address] [place] [address]     Phone:  [phone]     sertraline 100 MG tablet    traZODone 100 MG tablet         Some of these will need a paper prescription and others can be bought over the counter.  Ask your nurse if you have questions.     Bring a paper prescription for each of these medications     clonazePAM 0.5 MG tablet    oxyCODONE-acetaminophen 5-325 MG per tablet               Information about OPIOIDS     PRESCRIPTION OPIOIDS: WHAT YOU NEED TO KNOW   We gave you an opioid (narcotic) pain medicine. It is important to manage your pain, but opioids are not always the best choice. You should first try all the other options your care team gave you. Take this medicine for as short a time (and as few doses) as possible.     These medicines have risks:    DO NOT drive when on new or higher doses of pain medicine. These medicines can affect your alertness and reaction times, and you could be arrested for driving under the influence (DUI). If you need to use opioids long-term, talk to your care team about driving.    DO NOT operate heave machinery    DO NOT do any other dangerous activities while taking these medicines.     DO NOT drink any alcohol while taking these medicines.      If the opioid prescribed includes acetaminophen, DO NOT take with any other medicines that contain acetaminophen. Read all labels carefully. Look for the word   acetaminophen  or  Tylenol.  Ask your pharmacist if you have questions or are unsure.    You can get addicted to pain medicines, especially if you have a history of addiction (chemical, alcohol or substance dependence). Talk to your care team about ways to reduce this risk.    Store your pills in a secure place, locked if possible. We will not replace any lost or stolen medicine. If you don t finish your medicine, please throw away (dispose) as directed by your pharmacist. The [place] Pollution Control Agency has more information about safe disposal: https://www.pca.[facility].[place].us/living-green/managing-unwanted-medications.     All opioids tend to cause constipation. Drink plenty of water and eat foods that have a lot of fiber, such as fruits, vegetables, prune juice, apple juice and high-fiber cereal. Take a laxative (Miralax, milk of magnesia, Colace, Senna) if you don t move your bowels at least every other day.          Primary Care Provider Office Phone # Fax #    Nikki Castro PA-C [phone]       [address]  [place] [address]        Equal Access to Services     [facility]: Hadii aad ku hadasho Soomaali, waaxda luqadaha, qaybta kaalmada adeleonardyada, enrrique avila . So [facility] [phone].    ATENCIÓN: Si habla español, tiene a mirza disposición servicios gratuitos de asistencia lingüística. Filiberto al [phone].    We comply with applicable federal civil rights laws and [place] laws. We do not discriminate on the basis of race, color, national origin, age, disability, sex, sexual orientation, or gender identity.            Thank you!     Thank you for choosing [facility]  for your care. Our goal is always to provide you with excellent care. Hearing back from our patients is one way we can continue to improve our services. Please take a few minutes to complete the written survey that you may receive in the mail after your visit with us. Thank  you!             Your Updated Medication List - Protect others around you: Learn how to safely use, store and throw away your medicines at www.disposemymeds.org.          This list is accurate as of 6/22/18 11:26 AM.  Always use your most recent med list.                   Brand Name Dispense Instructions for use Diagnosis    clonazePAM 0.5 MG tablet    klonoPIN    60 tablet    Take 1 tablet (0.5 mg) by mouth 2 times daily as needed for anxiety ; keep use to a minimum, this is a 3 month supply    Depression with anxiety, Bipolar affective disorder, remission status unspecified (H)       oxyCODONE-acetaminophen 5-325 MG per tablet   Start taking on:  8/17/2018    PERCOCET    20 tablet    Take 1 tablet by mouth every 6 hours as needed for pain - This is a 1 month supply    Pain in joint, multiple sites       sertraline 100 MG tablet    ZOLOFT    135 tablet    Take 1.5 tablets (150 mg) by mouth daily    Depression with anxiety, Bipolar affective disorder, remission status unspecified (H)       traZODone 100 MG tablet    DESYREL    180 tablet    Take 1-2 tablets (100-200 mg) by mouth nightly as needed for sleep    Adjustment insomnia

## 2018-06-22 NOTE — LETTER
My Depression Action Plan  Name: Joellen Richter   Date of Birth 1970  Date: 6/22/2018    My doctor: Nikki Castro   My clinic: [facility]  [address] [facility] [facility]  [place] [address] [phone]          GREEN    ZONE   Good Control    What it looks like:     Things are going generally well. You have normal up s and down s. You may even feel depressed from time to time, but bad moods usually last less than a day.   What you need to do:  1. Continue to care for yourself (see self care plan)  2. Check your depression survival kit and update it as needed  3. Follow your physician s recommendations including any medication.  4. Do not stop taking medication unless you consult with your physician first.           YELLOW         ZONE Getting Worse    What it looks like:     Depression is starting to interfere with your life.     It may be hard to get out of bed; you may be starting to isolate yourself from others.    Symptoms of depression are starting to last most all day and this has happened for several days.     You may have suicidal thoughts but they are not constant.   What you need to do:     1. Call your care team, your response to treatment will improve if you keep your care team informed of your progress. Yellow periods are signs an adjustment may need to be made.     2. Continue your self-care, even if you have to fake it!    3. Talk to someone in your support network    4. Open up your depression survival kit           RED    ZONE Medical Alert - Get Help    What it looks like:     Depression is seriously interfering with your life.     You may experience these or other symptoms: You can t get out of bed most days, can t work or engage in other necessary activities, you have trouble taking care of basic hygiene, or basic responsibilities, thoughts of suicide or death that will not go away, self-injurious behavior.     What you need to do:  1. Call your care  team and request a same-day appointment. If they are not available (weekends or after hours) call your local crisis line, emergency room or 911.            Depression Self Care Plan / Survival Kit    Self-Care for Depression  Here s the deal. Your body and mind are really not as separate as most people think.  What you do and think affects how you feel and how you feel influences what you do and think. This means if you do things that people who feel good do, it will help you feel better.  Sometimes this is all it takes.  There is also a place for medication and therapy depending on how severe your depression is, so be sure to consult with your medical provider and/ or Behavioral Health Consultant if your symptoms are worsening or not improving.     In order to better manage my stress, I will:    Exercise  Get some form of exercise, every day. This will help reduce pain and release endorphins, the  feel good  chemicals in your brain. This is almost as good as taking antidepressants!  This is not the same as joining a gym and then never going! (they count on that by the way ) It can be as simple as just going for a walk or doing some gardening, anything that will get you moving.      Hygiene   Maintain good hygiene (Get out of bed in the morning, Make your bed, Brush your teeth, Take a shower, and Get dressed like you were going to work, even if you are unemployed).  If your clothes don't fit try to get ones that do.    Diet  I will strive to eat foods that are good for me, drink plenty of water, and avoid excessive sugar, caffeine, alcohol, and other mood-altering substances.  Some foods that are helpful in depression are: complex carbohydrates, B vitamins, flaxseed, fish or fish oil, fresh fruits and vegetables.    Psychotherapy  I agree to participate in Individual Therapy (if recommended).    Medication  If prescribed medications, I agree to take them.  Missing doses can result in serious side effects.  I  understand that drinking alcohol, or other illicit drug use, may cause potential side effects.  I will not stop my medication abruptly without first discussing it with my provider.    Staying Connected With Others  I will stay in touch with my friends, family members, and my primary care provider/team.    Use your imagination  Be creative.  We all have a creative side; it doesn t matter if it s oil painting, sand castles, or mud pies! This will also kick up the endorphins.    Witness Beauty  (AKA stop and smell the roses) Take a look outside, even in mid-winter. Notice colors, textures. Watch the squirrels and birds.     Service to others  Be of service to others.  There is always someone else in need.  By helping others we can  get out of ourselves  and remember the really important things.  This also provides opportunities for practicing all the other parts of the program.    Humor  Laugh and be silly!  Adjust your TV habits for less news and crime-drama and more comedy.    Control your stress  Try breathing deep, massage therapy, biofeedback, and meditation. Find time to relax each day.     My support system    Clinic Contact:  Phone number:    Contact 1:  Phone number:    Contact 2:  Phone number:    [employer]/:  Phone number:    Therapist:  Phone number:    Local crisis center:    Phone number:    Other community support:  Phone number:

## 2018-06-22 NOTE — PROGRESS NOTES
SUBJECTIVE:   Joellen Richter is a 48 year old female who presents to clinic today for the following health issues:      Depression and Anxiety Follow-Up    Status since last visit: Worsened menopause     Other associated symptoms:cry easily     Complicating factors:     Significant life event: No     Current substance abuse: None    Recently kicked son out for being disrespectful   Hot flashes and mood swings, easy crying   Doesn't want to adjust her Zoloft, feels its working well      PHQ-9 2016   Total Score 9 15 0   Q9: Suicide Ideation Not at all Several days Not at all     CHON-7 SCORE 2016   Total Score - - -   Total Score 15 12 0       PHQ-9  English  PHQ-9   Any Language  CHON-7  Suicide Assessment Five-step Evaluation and Treatment (SAFE-T)    Amount of exercise or physical activity: 4-5 days/week for an average of greater than 60 minutes    Problems taking medications regularly: No    Medication side effects: none    Diet: regular (no restrictions)        PROBLEMS TO ADD ON...  Arthritis on the hands   Bilateral   Ongoing  Using NSAIDs daily  Some swelling  No other joints affected  Does have a family hx of RA  Does not want injections or daily pills  Has used her 's percocet with relief of pain  -------------------------------------    Problem list and histories reviewed & adjusted, as indicated.  Additional history: as documented    Patient Active Problem List   Diagnosis     Tobacco abuse     Bipolar affective disorder (H)     Hyperlipidemia with target LDL less than 130     Hypothyroidism     Depression with anxiety     Chronic hand pain, unspecified laterality     Past Surgical History:   Procedure Laterality Date     C LAPAROSCOPY, SURGICAL; W/ VAGINAL HYSTERECTOMY W/WO REMOVAL OVARY(S)/TUBES  8/15/08     C/SECTION, CLASSICAL      3 , Classical     HYSTERECTOMY       TUBAL LIGATION         Social History   Substance Use Topics      Smoking status: Current Every Day Smoker     Packs/day: 0.50     Years: 10.00     Types: Cigarettes     Smokeless tobacco: Never Used     Alcohol use Yes      Comment:  3-4 beers per week- quit drinking in 2/2011     Family History   Problem Relation Age of Onset     Cancer Mother      liver, ETOH     Hypertension Mother      Depression Mother      Diabetes Father      adult     Hypertension Father      Obesity Father      Cerebrovascular Disease Father      Arthritis Father      rheumatoid     Depression Father      Eye Disorder Father      DM     Lipids Father      Osteoperosis Father      Cancer Paternal Grandmother      blood     Diabetes Paternal Grandmother      Hypertension Paternal Grandmother      Obesity Paternal Grandmother      Cancer Paternal Grandfather      lung     Hypertension Paternal Grandfather      Cancer Sister      UTERINE CANCER at 39yo     Depression Sister      Depression Daughter            Reviewed and updated as needed this visit by clinical staff  Tobacco  Allergies  Meds       Reviewed and updated as needed this visit by Provider         ROS:  Constitutional, neuro, endocrine, musculoskeletal, and psychiatric systems are negative, except as otherwise noted.    OBJECTIVE:                                                    Pulse 56  Temp 98.1  F (36.7  C) (Tympanic)  Resp 18  Wt 164 lb 6.4 oz (74.6 kg)  LMP 07/20/2008  SpO2 100%  BMI 26.53 kg/m2  Body mass index is 26.53 kg/(m^2).  Constitutional: healthy, alert, active, no distress.    Head: Normocephalic  Musculoskeletal: extremities normal- no gross deformities noted, gait normal, normal muscle tone and able to move about the exam room without difficulty.    Skin: no suspicious lesions or rashes appreciated on exposed areas  Neurologic: Gait normal. Moving all extremities spontaneously, no apparent weakness.    Psychiatric: mentation appears normal, thoughts are clear and concise. Converses appropriately. Affect is  Appropriate/mood-congruent       ASSESSMENT:                                                      1. Depression with anxiety    2. Hypothyroidism due to acquired atrophy of thyroid    3. Bipolar affective disorder, remission status unspecified (H)    4. Adjustment insomnia    5. Pain in joint, multiple sites    6. NSAID long-term use    7. Chronic hand pain, unspecified laterality    8. Menopausal syndrome (hot flashes)         PLAN:                                                    Zoloft, trazodone, and clonazepam renewed, no changes. Could trial Effexor, low dose, in future for hot flashes.     Will recheck her TSH. If she is hypothyroid will trial an all natural supplement like [place] or NP. Patient willing to try this.     Percocet for pain. Three 1-month prescriptions for Percocet 5/325mg #20 were given to the patient, dated 28-30 days apart. Controlled substance agreement signed. UDS at next visit.    Patient Instructions   Black Cohosh - all natural supplement for hot flashes     Call the clinic in 3 months for another set of three refills for your Percocet.   I'll see you back in the office in 6 months.       The patient was in agreement with the plan today and had no questions or concerns prior to leaving the clinic.     Nikki Castro PA-C  [facility]

## 2018-06-22 NOTE — PATIENT INSTRUCTIONS
Black Cohosh - all natural supplement for hot flashes     Call the clinic in 3 months for another set of three refills for your Percocet.   I'll see you back in the office in 6 months.

## 2018-06-22 NOTE — LETTER
June 26, 2018      Joellen BARRERA Neelam  [address] [address] [address]        Dear ,    We are writing to inform you of your test results.    Overall, your labs look good.   The thyroid marker is ever so slightly elevated and the T4 hormone is normal.   Your Rheumatoid arthritis markers are negative.   Kidney function remains stable.     Resulted Orders   TSH with free T4 reflex   Result Value Ref Range    TSH 4.03 (H) 0.40 - 4.00 mU/L   Rheumatoid factor   Result Value Ref Range    Rheumatoid Factor <20 <20 IU/mL   Cyclic Citrullinated Peptide Antibody IgG   Result Value Ref Range    Cyclic Citrullinated Peptide Antibody, IgG <1 <7 U/mL   Creatinine   Result Value Ref Range    Creatinine 0.91 0.52 - 1.04 mg/dL    GFR Estimate 66 >60 mL/min/1.7m2      Comment:      Non  GFR Calc    GFR Estimate If Black 80 >60 mL/min/1.7m2      Comment:      African American GFR Calc   T4 free   Result Value Ref Range    T4 Free 0.79 0.76 - 1.46 ng/dL       If you have any questions or concerns, please call the clinic at the number listed above.       Sincerely,        Nikki Castro PA-C/kokoo

## 2018-06-23 ASSESSMENT — PATIENT HEALTH QUESTIONNAIRE - PHQ9: SUM OF ALL RESPONSES TO PHQ QUESTIONS 1-9: 5

## 2018-06-23 ASSESSMENT — ANXIETY QUESTIONNAIRES: GAD7 TOTAL SCORE: 7

## 2018-06-24 LAB — RHEUMATOID FACT SER NEPH-ACNC: <20 IU/ML (ref 0–20)

## 2018-06-25 LAB — CCP AB SER IA-ACNC: <1 U/ML

## 2018-06-25 NOTE — PROGRESS NOTES
Please send the following letter to the patient:    Joellen,    Overall, your labs look good.  The thyroid marker is ever so slightly elevated and the T4 hormone is normal.  Your Rheumatoid arthritis markers are negative.   Kidney function remains stable.    Please call me with any questions or concerns.          Nikki Castro PA-C

## 2019-06-24 ENCOUNTER — OFFICE VISIT (OUTPATIENT)
Dept: FAMILY MEDICINE | Facility: CLINIC | Age: 49
End: 2019-06-24
Payer: COMMERCIAL

## 2019-06-24 VITALS
TEMPERATURE: 98.4 F | BODY MASS INDEX: 23.85 KG/M2 | RESPIRATION RATE: 16 BRPM | DIASTOLIC BLOOD PRESSURE: 80 MMHG | SYSTOLIC BLOOD PRESSURE: 142 MMHG | HEIGHT: 66 IN | WEIGHT: 148.4 LBS | HEART RATE: 58 BPM

## 2019-06-24 DIAGNOSIS — F31.9 BIPOLAR AFFECTIVE DISORDER, REMISSION STATUS UNSPECIFIED (H): ICD-10-CM

## 2019-06-24 DIAGNOSIS — R11.0 NAUSEA: ICD-10-CM

## 2019-06-24 DIAGNOSIS — F51.02 ADJUSTMENT INSOMNIA: ICD-10-CM

## 2019-06-24 DIAGNOSIS — F32.A ANXIETY AND DEPRESSION: Primary | ICD-10-CM

## 2019-06-24 DIAGNOSIS — F41.9 ANXIETY AND DEPRESSION: Primary | ICD-10-CM

## 2019-06-24 PROCEDURE — 99214 OFFICE O/P EST MOD 30 MIN: CPT | Performed by: PHYSICIAN ASSISTANT

## 2019-06-24 RX ORDER — ONDANSETRON 4 MG/1
4-8 TABLET, FILM COATED ORAL EVERY 8 HOURS PRN
Qty: 30 TABLET | Refills: 3 | Status: SHIPPED | OUTPATIENT
Start: 2019-06-24 | End: 2020-11-27

## 2019-06-24 RX ORDER — SERTRALINE HYDROCHLORIDE 100 MG/1
150 TABLET, FILM COATED ORAL DAILY
Qty: 135 TABLET | Refills: 3 | Status: SHIPPED | OUTPATIENT
Start: 2019-06-24 | End: 2020-11-11

## 2019-06-24 RX ORDER — TRAZODONE HYDROCHLORIDE 100 MG/1
100-200 TABLET ORAL
Qty: 180 TABLET | Refills: 3 | Status: SHIPPED | OUTPATIENT
Start: 2019-06-24 | End: 2020-11-11

## 2019-06-24 RX ORDER — CLONAZEPAM 0.5 MG/1
0.5 TABLET ORAL 2 TIMES DAILY PRN
Qty: 60 TABLET | Refills: 1 | Status: SHIPPED | OUTPATIENT
Start: 2019-06-24 | End: 2020-01-08

## 2019-06-24 ASSESSMENT — MIFFLIN-ST. JEOR: SCORE: 1314.89

## 2019-06-24 ASSESSMENT — PAIN SCALES - GENERAL: PAINLEVEL: NO PAIN (0)

## 2019-06-24 NOTE — PROGRESS NOTES
"Subjective     Joellen Richter is a 49 year old female who presents to clinic today for the following health issues:    HPI     *Patient is requesting a refill of anti-nausea medication, not on current med list and could not locate for pending.    Depression and Anxiety Follow-Up    How are you doing with your depression since your last visit? Improved    How are you doing with your anxiety since your last visit?  Improved    Are you having other symptoms that might be associated with depression or anxiety? No    Have you had a significant life event? No     Do you have any concerns with your use of alcohol or other drugs? No    Social History     Tobacco Use     Smoking status: Current Every Day Smoker     Packs/day: 0.50     Years: 10.00     Pack years: 5.00     Types: Cigarettes     Smokeless tobacco: Never Used   Substance Use Topics     Alcohol use: Yes     Comment:  3-4 beers per week- quit drinking in 2/2011     Drug use: No     PHQ 12/13/2016 6/5/2017 6/22/2018   PHQ-9 Total Score 15 0 5   Q9: Thoughts of better off dead/self-harm past 2 weeks Several days Not at all Not at all     CHON-7 SCORE 12/13/2016 6/5/2017 6/22/2018   Total Score - - -   Total Score 12 0 7         Suicide Assessment Five-step Evaluation and Treatment (SAFE-T)    Amount of exercise or physical activity: None    Problems taking medications regularly: No    Medication side effects: none    Diet: regular (no restrictions)      Reviewed and updated as needed this visit by Provider         Review of Systems   ROS COMP: Constitutional, and psych systems are negative, except as otherwise noted.      Objective    /80   Pulse 58   Temp 98.4  F (36.9  C) (Tympanic)   Resp 16   Ht 1.676 m (5' 6\")   Wt 67.3 kg (148 lb 6.4 oz)   LMP 07/20/2008   BMI 23.95 kg/m    Body mass index is 23.95 kg/m .  Physical Exam   Constitutional: healthy, alert, active, no distress.    Head: Normocephalic   Musculoskeletal: extremities normal- no " gross deformities noted, gait normal, normal muscle tone and able to move about the exam room without difficulty.    Skin: no suspicious lesions or rashes appreciated on exposed areas  Neurologic: Gait normal. Moving all extremities spontaneously, no apparent weakness.    Psychiatric: mentation appears normal, thoughts are clear and concise. Converses appropriately. Affect is Appropriate/mood-congruent          Assessment & Plan   Assessment  1. Anxiety and depression    2. Bipolar affective disorder, remission status unspecified (H)    3. Adjustment insomnia    4. Nausea         Plan  1-3) Meds renewed, no changes. Follow up annually.    4) has occassional nausea. Zofran prescription provided.     Tobacco Cessation:   reports that she has been smoking cigarettes.  She has a 5.00 pack-year smoking history. She has never used smokeless tobacco.    Return in about 1 year (around 6/24/2020) for an annual office visit.    Nikki Castro PA-C  [facility]

## 2019-06-24 NOTE — NURSING NOTE
"Initial /72 (BP Location: Left arm, Patient Position: Chair, Cuff Size: Adult Regular)   Pulse 58   Temp 98.4  F (36.9  C) (Tympanic)   Resp 16   Ht 1.676 m (5' 6\")   Wt 67.3 kg (148 lb 6.4 oz)   LMP 07/20/2008   BMI 23.95 kg/m   Estimated body mass index is 23.95 kg/m  as calculated from the following:    Height as of this encounter: 1.676 m (5' 6\").    Weight as of this encounter: 67.3 kg (148 lb 6.4 oz). .    Heydi Davidson CMA ([facility])    "

## 2019-12-18 ENCOUNTER — TELEPHONE (OUTPATIENT)
Dept: FAMILY MEDICINE | Facility: CLINIC | Age: 49
End: 2019-12-18

## 2019-12-18 DIAGNOSIS — R39.9 UTI SYMPTOMS: Primary | ICD-10-CM

## 2019-12-18 DIAGNOSIS — R39.9 UTI SYMPTOMS: ICD-10-CM

## 2019-12-18 LAB
ALBUMIN UR-MCNC: 100 MG/DL
APPEARANCE UR: ABNORMAL
BACTERIA #/AREA URNS HPF: ABNORMAL /HPF
BILIRUB UR QL STRIP: NEGATIVE
COLOR UR AUTO: YELLOW
GLUCOSE UR STRIP-MCNC: NEGATIVE MG/DL
HGB UR QL STRIP: ABNORMAL
KETONES UR STRIP-MCNC: NEGATIVE MG/DL
LEUKOCYTE ESTERASE UR QL STRIP: ABNORMAL
NITRATE UR QL: NEGATIVE
NON-SQ EPI CELLS #/AREA URNS LPF: ABNORMAL /LPF
PH UR STRIP: 5.5 PH (ref 5–7)
RBC #/AREA URNS AUTO: ABNORMAL /HPF
SOURCE: ABNORMAL
SP GR UR STRIP: 1.01 (ref 1–1.03)
UROBILINOGEN UR STRIP-ACNC: 0.2 EU/DL (ref 0.2–1)
WBC #/AREA URNS AUTO: ABNORMAL /HPF

## 2019-12-18 PROCEDURE — 87086 URINE CULTURE/COLONY COUNT: CPT | Performed by: PHYSICIAN ASSISTANT

## 2019-12-18 PROCEDURE — 81001 URINALYSIS AUTO W/SCOPE: CPT | Performed by: PHYSICIAN ASSISTANT

## 2019-12-18 PROCEDURE — 87186 SC STD MICRODIL/AGAR DIL: CPT | Performed by: PHYSICIAN ASSISTANT

## 2019-12-18 PROCEDURE — 87088 URINE BACTERIA CULTURE: CPT | Performed by: PHYSICIAN ASSISTANT

## 2019-12-18 RX ORDER — CIPROFLOXACIN 500 MG/1
500 TABLET, FILM COATED ORAL 2 TIMES DAILY
Qty: 6 TABLET | Refills: 0 | Status: SHIPPED | OUTPATIENT
Start: 2019-12-18 | End: 2019-12-21

## 2019-12-18 NOTE — TELEPHONE ENCOUNTER
Patient gave ua/uc sample and instructed to  abx at [facility] pharmacy. She voiced understanding and agreement.  She knows it may take 24 hours to get good relief.  Mirtha Salgado RN

## 2019-12-18 NOTE — TELEPHONE ENCOUNTER
Reason for Call:  Other prescription    Detailed comments: Would like an RX for a bladder infecton    Phone Number Patient can be reached at: Home number on file [phone] (home)    Best Time: ASAP    Can we leave a detailed message on this number? YES    Call taken on 12/18/2019 at 9:30 AM by Beryl Paniagua

## 2019-12-18 NOTE — TELEPHONE ENCOUNTER
Spoke with patient and she reports dysuria and hematuria x 2 days.  No fever no flank pain.  She is asking for an antibiotic.  She does not have My chart, but did discuss her trying On care.  Patient still wanted message sent to provider asking if can be treated.    RN let patient know message will be sent, but if she does not here back in a few hours, she needs to do the On care or UC.  Best for treatment is having a ua/uc done so can be on correct abx.  Please advise.  Mirtha Salgado RN

## 2019-12-18 NOTE — TELEPHONE ENCOUNTER
Yes, I'd prefer a UA/UC be done prior to starting abx. Once she leaves a urine sample she can start the abx (sent to pharm)

## 2019-12-18 NOTE — TELEPHONE ENCOUNTER
Patient notified and voiced understanding and agreement.  Will watch for patient to do ua/uc then will have patient  script at pharmacy

## 2019-12-19 LAB
BACTERIA SPEC CULT: ABNORMAL
SPECIMEN SOURCE: ABNORMAL

## 2020-11-11 ENCOUNTER — TELEPHONE (OUTPATIENT)
Dept: FAMILY MEDICINE | Facility: CLINIC | Age: 50
End: 2020-11-11

## 2020-11-11 NOTE — TELEPHONE ENCOUNTER
Duplicate.     Refills have been sent to Nikki Castro PA-C for review.    Merced Fletcher RN BSN

## 2020-11-11 NOTE — TELEPHONE ENCOUNTER
Reason for Call:  Medication or medication refill:    Do you use a [facility]?  Name of the pharmacy and phone number for the current request:  [facility] [phone]    Name of the medication requested: zoloft, klonopin trazodone     Other request: patient has schedule an appointment with pcp      Can we leave a detailed message on this number? YES    Phone number patient can be reached at: Home number on file [phone] (home)    Best Time: any    Call taken on 11/11/2020 at 7:59 AM by Isatu Saucedo

## 2020-11-27 ENCOUNTER — OFFICE VISIT (OUTPATIENT)
Dept: FAMILY MEDICINE | Facility: CLINIC | Age: 50
End: 2020-11-27
Payer: COMMERCIAL

## 2020-11-27 VITALS
OXYGEN SATURATION: 98 % | DIASTOLIC BLOOD PRESSURE: 76 MMHG | TEMPERATURE: 98 F | BODY MASS INDEX: 20.69 KG/M2 | HEART RATE: 59 BPM | SYSTOLIC BLOOD PRESSURE: 126 MMHG | WEIGHT: 128.2 LBS

## 2020-11-27 DIAGNOSIS — E03.4 HYPOTHYROIDISM DUE TO ACQUIRED ATROPHY OF THYROID: ICD-10-CM

## 2020-11-27 DIAGNOSIS — F31.9 BIPOLAR AFFECTIVE DISORDER, REMISSION STATUS UNSPECIFIED (H): ICD-10-CM

## 2020-11-27 DIAGNOSIS — Z13.220 LIPID SCREENING: ICD-10-CM

## 2020-11-27 DIAGNOSIS — R11.2 NON-INTRACTABLE VOMITING WITH NAUSEA, UNSPECIFIED VOMITING TYPE: ICD-10-CM

## 2020-11-27 DIAGNOSIS — F41.9 ANXIETY AND DEPRESSION: ICD-10-CM

## 2020-11-27 DIAGNOSIS — Z00.01 ENCOUNTER FOR ROUTINE ADULT MEDICAL EXAM WITH ABNORMAL FINDINGS: Primary | ICD-10-CM

## 2020-11-27 DIAGNOSIS — F32.A ANXIETY AND DEPRESSION: ICD-10-CM

## 2020-11-27 DIAGNOSIS — Z13.1 DIABETES MELLITUS SCREENING: ICD-10-CM

## 2020-11-27 DIAGNOSIS — Z11.4 SCREENING FOR HIV (HUMAN IMMUNODEFICIENCY VIRUS): ICD-10-CM

## 2020-11-27 DIAGNOSIS — N95.1 MENOPAUSAL SYNDROME (HOT FLASHES): ICD-10-CM

## 2020-11-27 DIAGNOSIS — Z11.59 NEED FOR HEPATITIS C SCREENING TEST: ICD-10-CM

## 2020-11-27 LAB
CHOLEST SERPL-MCNC: 222 MG/DL
HBA1C MFR BLD: 5.1 % (ref 0–5.6)
HDLC SERPL-MCNC: 45 MG/DL
LDLC SERPL CALC-MCNC: 150 MG/DL
NONHDLC SERPL-MCNC: 177 MG/DL
TRIGL SERPL-MCNC: 135 MG/DL
TSH SERPL DL<=0.005 MIU/L-ACNC: 3.34 MU/L (ref 0.4–4)

## 2020-11-27 PROCEDURE — 99396 PREV VISIT EST AGE 40-64: CPT | Performed by: PHYSICIAN ASSISTANT

## 2020-11-27 PROCEDURE — 36415 COLL VENOUS BLD VENIPUNCTURE: CPT | Performed by: PHYSICIAN ASSISTANT

## 2020-11-27 PROCEDURE — 99213 OFFICE O/P EST LOW 20 MIN: CPT | Mod: 25 | Performed by: PHYSICIAN ASSISTANT

## 2020-11-27 PROCEDURE — 86803 HEPATITIS C AB TEST: CPT | Performed by: PHYSICIAN ASSISTANT

## 2020-11-27 PROCEDURE — G0145 SCR C/V CYTO,THINLAYER,RESCR: HCPCS | Performed by: PHYSICIAN ASSISTANT

## 2020-11-27 PROCEDURE — 83036 HEMOGLOBIN GLYCOSYLATED A1C: CPT | Performed by: PHYSICIAN ASSISTANT

## 2020-11-27 PROCEDURE — 87389 HIV-1 AG W/HIV-1&-2 AB AG IA: CPT | Performed by: PHYSICIAN ASSISTANT

## 2020-11-27 PROCEDURE — 84443 ASSAY THYROID STIM HORMONE: CPT | Performed by: PHYSICIAN ASSISTANT

## 2020-11-27 PROCEDURE — 87624 HPV HI-RISK TYP POOLED RSLT: CPT | Performed by: PHYSICIAN ASSISTANT

## 2020-11-27 PROCEDURE — 80061 LIPID PANEL: CPT | Performed by: PHYSICIAN ASSISTANT

## 2020-11-27 RX ORDER — VENLAFAXINE HYDROCHLORIDE 37.5 MG/1
37.5 CAPSULE, EXTENDED RELEASE ORAL DAILY
Qty: 90 CAPSULE | Refills: 3 | Status: SHIPPED | OUTPATIENT
Start: 2020-11-27 | End: 2021-04-13

## 2020-11-27 RX ORDER — SERTRALINE HYDROCHLORIDE 100 MG/1
150 TABLET, FILM COATED ORAL DAILY
Qty: 135 TABLET | Refills: 3 | Status: SHIPPED | OUTPATIENT
Start: 2020-11-27 | End: 2022-02-15

## 2020-11-27 RX ORDER — DOCUSATE SODIUM 100 MG/1
100 CAPSULE, LIQUID FILLED ORAL 2 TIMES DAILY PRN
Qty: 180 CAPSULE | Refills: 1 | Status: CANCELLED | OUTPATIENT
Start: 2020-11-27

## 2020-11-27 RX ORDER — PROMETHAZINE HYDROCHLORIDE 25 MG/1
12.5-25 TABLET ORAL EVERY 6 HOURS PRN
Qty: 30 TABLET | Refills: 2 | Status: SHIPPED | OUTPATIENT
Start: 2020-11-27 | End: 2022-02-15

## 2020-11-27 ASSESSMENT — ANXIETY QUESTIONNAIRES
IF YOU CHECKED OFF ANY PROBLEMS ON THIS QUESTIONNAIRE, HOW DIFFICULT HAVE THESE PROBLEMS MADE IT FOR YOU TO DO YOUR WORK, TAKE CARE OF THINGS AT HOME, OR GET ALONG WITH OTHER PEOPLE: SOMEWHAT DIFFICULT
5. BEING SO RESTLESS THAT IT IS HARD TO SIT STILL: NOT AT ALL
3. WORRYING TOO MUCH ABOUT DIFFERENT THINGS: SEVERAL DAYS
7. FEELING AFRAID AS IF SOMETHING AWFUL MIGHT HAPPEN: NOT AT ALL
1. FEELING NERVOUS, ANXIOUS, OR ON EDGE: NOT AT ALL
GAD7 TOTAL SCORE: 5
6. BECOMING EASILY ANNOYED OR IRRITABLE: NEARLY EVERY DAY
2. NOT BEING ABLE TO STOP OR CONTROL WORRYING: SEVERAL DAYS

## 2020-11-27 ASSESSMENT — PATIENT HEALTH QUESTIONNAIRE - PHQ9
SUM OF ALL RESPONSES TO PHQ QUESTIONS 1-9: 12
5. POOR APPETITE OR OVEREATING: NOT AT ALL

## 2020-11-27 NOTE — PATIENT INSTRUCTIONS
Colace/docusate 100mg either once daily or twice daily        Preventive Health Recommendations  Female Ages 50 - 64    Yearly exam: See your health care provider every year in order to  o Review health changes.   o Discuss preventive care.    o Review your medicines if your doctor has prescribed any.      Get a Pap test every three years (unless you have an abnormal result and your provider advises testing more often).    If you get Pap tests with HPV test, you only need to test every 5 years, unless you have an abnormal result.     You do not need a Pap test if your uterus was removed (hysterectomy) and you have not had cancer.    You should be tested each year for STDs (sexually transmitted diseases) if you're at risk.     Have a mammogram every 1 to 2 years.    Have a colonoscopy at age 50, or have a yearly FIT test (stool test). These exams screen for colon cancer.      Have a cholesterol test every 5 years, or more often if advised.    Have a diabetes test (fasting glucose) every three years. If you are at risk for diabetes, you should have this test more often.     If you are at risk for osteoporosis (brittle bone disease), think about having a bone density scan (DEXA).    Shots: Get a flu shot each year. Get a tetanus shot every 10 years.    Nutrition:     Eat at least 5 servings of fruits and vegetables each day.    Eat whole-grain bread, whole-wheat pasta and brown rice instead of white grains and rice.    Get adequate Calcium and Vitamin D.     Lifestyle    Exercise at least 150 minutes a week (30 minutes a day, 5 days a week). This will help you control your weight and prevent disease.    Limit alcohol to one drink per day.    No smoking.     Wear sunscreen to prevent skin cancer.     See your dentist every six months for an exam and cleaning.    See your eye doctor every 1 to 2 years.

## 2020-11-27 NOTE — LETTER
December 1, 2020      Joellen Richter  [address] [address]        Dear ,    We are writing to inform you of your test results.    Your viral screenings are normal.   Your blood sugar is normal - no signs of diabetes.   Thyroid is in good balance.   Cholesterol has improved since last year.     The below information is a cardiac risk score and helps determine which patients would benefit from cholesterol lowering medication. Your score doesn't indicate the need for medication yet. We consider statin therapy when the 10 year risk score is 7.5 % or higher.       The 10-year ASCVD risk score (Elizabeth MCKEON Jr., et al., 2013) is: 5%     Values used to calculate the score:       Age: 50 years       Sex: Female       Is Non- : No       Diabetic: No       Tobacco smoker: Yes       Systolic Blood Pressure: 126 mmHg       Is BP treated: No       HDL Cholesterol: 45 mg/dL       Total Cholesterol: 222 mg/dL   Resulted Orders   HIV Antigen Antibody Combo   Result Value Ref Range    HIV Antigen Antibody Combo Nonreactive NR^Nonreactive          Comment:      HIV-1 p24 Ag & HIV-1/HIV-2 Ab Not Detected   Hepatitis C Screen Reflex to HCV RNA Quant and Genotype   Result Value Ref Range    Hepatitis C Antibody Nonreactive NR^Nonreactive      Comment:      Assay performance characteristics have not been established for newborns,   infants, and children     TSH WITH FREE T4 REFLEX   Result Value Ref Range    TSH 3.34 0.40 - 4.00 mU/L   Lipid panel reflex to direct LDL Non-fasting   Result Value Ref Range    Cholesterol 222 (H) <200 mg/dL      Comment:      Desirable:       <200 mg/dl    Triglycerides 135 <150 mg/dL      Comment:      Non Fasting    HDL Cholesterol 45 (L) >49 mg/dL    LDL Cholesterol Calculated 150 (H) <100 mg/dL      Comment:      Above desirable:  100-129 mg/dl  Borderline High:  130-159 mg/dL  High:             160-189 mg/dL  Very high:       >189 mg/dl       Non HDL Cholesterol 177 (H) <130 mg/dL      Comment:      Above Desirable:  130-159 mg/dl  Borderline high:  160-189 mg/dl  High:             190-219 mg/dl  Very high:       >219 mg/dl     Hemoglobin A1c   Result Value Ref Range    Hemoglobin A1C 5.1 0 - 5.6 %      Comment:      Normal <5.7% Prediabetes 5.7-6.4%  Diabetes 6.5% or higher - adopted from ADA   consensus guidelines.         If you have any questions or concerns, please call the clinic at the number listed above.       Sincerely,      Nikki Castro PA-C/xochitl

## 2020-11-27 NOTE — PROGRESS NOTES
SUBJECTIVE:   CC: Joellen Richter is an 50 year old woman who presents for preventive health visit.     Patient has been advised of split billing requirements and indicates understanding: Yes  Healthy Habits:    Do you get at least three servings of calcium containing foods daily (dairy, green leafy vegetables, etc.)? yes    Amount of exercise or daily activities, outside of work: none    Problems taking medications regularly No    Medication side effects: No    Have you had an eye exam in the past two years? yes    Do you see a dentist twice per year? yes    Do you have sleep apnea, excessive snoring or daytime drowsiness?no      PROBLEMS TO ADD ON...  Fasting    Needing refills and/or labs for:    Depression/Anxiety  Update PHQ/CHON    Additional medications:  Trazodone    Would like to explore new options for nausea    3 compacted/hard BM's per week - has used Miralax and states it works too well - would like to consider a pill - hx of using docusate in 2015    Hot flashes     -------------------------------------    Today's PHQ-2 Score:   PHQ-2 ( 1999 [employer]) 11/27/2020 2/19/2016   Q1: Little interest or pleasure in doing things 3 0   Q2: Feeling down, depressed or hopeless 0 0   PHQ-2 Score 3 0       Abuse: Current or Past(Physical, Sexual or Emotional)- No  Do you feel safe in your environment? Yes        Social History     Tobacco Use     Smoking status: Current Every Day Smoker     Packs/day: 0.50     Years: 10.00     Pack years: 5.00     Types: Cigarettes     Smokeless tobacco: Never Used   Substance Use Topics     Alcohol use: Yes     Comment:  3-4 beers per week- quit drinking in 2/2011     If you drink alcohol do you typically have >3 drinks per day or >7 drinks per week? No                     Reviewed orders with patient.  Reviewed health maintenance and updated orders accordingly - Yes  Lab work is in process    Mammogram Screening: Patient over age 50, mutual decision to screen reflected in  health maintenance.    Pertinent mammograms are reviewed under the imaging tab.  History of abnormal Pap smear: NO - age 30-65 PAP every 5 years with negative HPV co-testing recommended  PAP / HPV 2/10/2015 12/19/2011 7/3/2008   PAP NIL NIL NIL     Reviewed and updated as needed this visit by clinical staff  Tobacco  Allergies  Meds              Reviewed and updated as needed this visit by Provider                Past Medical History:   Diagnosis Date     Anxiety      Bipolar affective disorder (H)      Hyperlipidaemia      Hypertension      Ovarian cyst     right        ROS:  Other than what is noted in the HPI and PMH a complete review of systems is otherwise negative including: Constitutional, HEENT, endocrine, cardiovascular, respiratory, GI/, musculoskeletal, neuro, and psychiatric.     OBJECTIVE:   /76   Pulse 59   Temp 98  F (36.7  C) (Tympanic)   Wt 58.2 kg (128 lb 3.2 oz)   LMP 07/20/2008   SpO2 98%   BMI 20.69 kg/m    EXAM:  GENERAL: healthy, alert and no distress  EYES: Eyes grossly normal to inspection, PERRL and conjunctivae and sclerae normal  HENT: ear canals and TM's normal, nose and mouth without ulcers or lesions  NECK: no adenopathy, no asymmetry, masses, or scars and thyroid normal to palpation  RESP: lungs clear to auscultation - no rales, rhonchi or wheezes  BREAST: normal without masses, tenderness or nipple discharge and no palpable axillary masses or adenopathy  CV: regular rates and rhythm, normal S1 S2, no S3 or S4 and no murmur, click or rub  ABDOMEN: soft, nontender, no hepatosplenomegaly, no masses and bowel sounds normal   (female): normal female external genitalia, normal urethral meatus, vaginal mucosa pink, moist, well rugated, and normal cervix  MS: no gross musculoskeletal defects noted, no edema  SKIN: no suspicious lesions or rashes  NEURO: Normal strength and tone, mentation intact and speech normal  PSYCH: mentation appears normal, affect  "normal/bright    ASSESSMENT/PLAN:       ICD-10-CM    1. Encounter for routine adult medical exam with abnormal findings  Z00.01 Pap imaged thin layer screen with HPV - recommended age 30 - 65 years (select HPV order below)     HPV High Risk Types DNA Cervical   2. Screening for HIV (human immunodeficiency virus)  Z11.4 HIV Antigen Antibody Combo   3. Need for hepatitis C screening test  Z11.59 Hepatitis C Screen Reflex to HCV RNA Quant and Genotype   4. Lipid screening  Z13.220 Lipid panel reflex to direct LDL Non-fasting   5. Diabetes mellitus screening  Z13.1 Hemoglobin A1c   6. Hypothyroidism due to acquired atrophy of thyroid  E03.4 TSH WITH FREE T4 REFLEX   7. Bipolar affective disorder, remission status unspecified (H)  F31.9 sertraline (ZOLOFT) 100 MG tablet   8. Anxiety and depression  F41.9 sertraline (ZOLOFT) 100 MG tablet    F32.9    9. Menopausal syndrome (hot flashes)  N95.1 venlafaxine (EFFEXOR-XR) 37.5 MG 24 hr capsule   10. Non-intractable vomiting with nausea, unspecified vomiting type  R11.2 promethazine (PHENERGAN) 25 MG tablet       1-5) Screenings discussed    6) Will repeat a TSH today; however, patient has voiced not wanting to treat her hypothyroid.     7-9) Continue Zoloft, no changes. Will add Effexor XR 37.5mg every day. This may help with both mood/anxiety and hot flashes.     10) Zofran not working. Will trial Phenergan. She is not interested in seeing GI for any further work up.      Patient has been advised of split billing requirements and indicates understanding: Yes  COUNSELING:   Reviewed preventive health counseling, as reflected in patient instructions    Estimated body mass index is 20.69 kg/m  as calculated from the following:    Height as of 6/24/19: 1.676 m (5' 6\").    Weight as of this encounter: 58.2 kg (128 lb 3.2 oz).    She reports that she has been smoking cigarettes. She has a 5.00 pack-year smoking history. She has never used smokeless tobacco.    Counseling " Resources:  ATP IV Guidelines  Pooled Cohorts Equation Calculator  Breast Cancer Risk Calculator  BRCA-Related Cancer Risk Assessment: FHS-7 Tool  FRAX Risk Assessment  ICSI Preventive Guidelines  Dietary Guidelines for Americans, 2010  USDA's MyPlate  ASA Prophylaxis  Lung CA Screening    Nikki Castro PA-C  [facility]

## 2020-11-28 ASSESSMENT — ANXIETY QUESTIONNAIRES: GAD7 TOTAL SCORE: 5

## 2020-11-29 LAB — HIV 1+2 AB+HIV1 P24 AG SERPL QL IA: NONREACTIVE

## 2020-11-30 LAB — HCV AB SERPL QL IA: NONREACTIVE

## 2020-11-30 NOTE — RESULT ENCOUNTER NOTE
Please send the following letter to the patient:    Joellen,    Your viral screenings are normal.  Your blood sugar is normal - no signs of diabetes.  Thyroid is in good balance.  Cholesterol has improved since last year.    The below information is a cardiac risk score and helps determine which patients would benefit from cholesterol lowering medication. Your score doesn't indicate the need for medication yet. We consider statin therapy when the 10 year risk score is 7.5 % or higher.      The 10-year ASCVD risk score (Elizabethantoine MCKEON Jr., et al., 2013) is: 5%    Values used to calculate the score:      Age: 50 years      Sex: Female      Is Non- : No      Diabetic: No      Tobacco smoker: Yes      Systolic Blood Pressure: 126 mmHg      Is BP treated: No      HDL Cholesterol: 45 mg/dL      Total Cholesterol: 222 mg/dL     Please call me with any questions or concerns.          Nikki Castro PA-C

## 2020-12-01 LAB
COPATH REPORT: NORMAL
PAP: NORMAL

## 2020-12-03 ENCOUNTER — PATIENT OUTREACH (OUTPATIENT)
Dept: FAMILY MEDICINE | Facility: CLINIC | Age: 50
End: 2020-12-03

## 2020-12-03 DIAGNOSIS — R87.810 CERVICAL HIGH RISK HPV (HUMAN PAPILLOMAVIRUS) TEST POSITIVE: ICD-10-CM

## 2020-12-03 LAB
FINAL DIAGNOSIS: ABNORMAL
HPV HR 12 DNA CVX QL NAA+PROBE: NEGATIVE
HPV16 DNA SPEC QL NAA+PROBE: POSITIVE
HPV18 DNA SPEC QL NAA+PROBE: NEGATIVE
SPECIMEN DESCRIPTION: ABNORMAL
SPECIMEN SOURCE CVX/VAG CYTO: ABNORMAL

## 2020-12-03 NOTE — TELEPHONE ENCOUNTER
2008, 2011, 2015 NIL paps.  11/27/20 NIL Pap, + HR HPV 16 (neg 18 & other). [place] due by 2/27/2021.

## 2020-12-03 NOTE — LETTER
January 26, 2021      Joellen Richter  [address] [address] [address]        Dear ,    At [facility], your health and wellness are our primary concern. That is why we are following up on your most recent positive high-risk Human Papillomavirus (HPV) test.    Please call [phone] and ask for the women's clinic to schedule an appointment for your recommended follow-up Colposcopy (this cannot be scheduled through [facility]) at your earliest convenience.     If you have completed the appointment outside of the [facility] system, please have the records forwarded to our office. We will update your chart for your provider to review before your next annual wellness visit.     Thank you for choosing [facility]!      Sincerely,    Your [facility] Care Team/ana

## 2020-12-03 NOTE — LETTER
May 11, 2021      Joellen Richter  [address] [address]        Dear ,    At [facility], your health and wellness are our primary concern. That is why we are following up on your most recent positive high-risk Human Papillomavirus (HPV) test.    Please call [phone] to schedule an appointment for your recommended follow-up Colposcopy (this cannot be scheduled through [facility]) at your earliest convenience.     If you have completed the appointment outside of the [facility] system, please have the records forwarded to our office. We will update your chart for your provider to review before your next annual wellness visit.     Thank you for choosing [facility]!    Sincerely,    Your [facility] Care Team

## 2020-12-14 ENCOUNTER — TRANSFERRED RECORDS (OUTPATIENT)
Dept: HEALTH INFORMATION MANAGEMENT | Facility: CLINIC | Age: 50
End: 2020-12-14

## 2020-12-14 ENCOUNTER — NURSE TRIAGE (OUTPATIENT)
Dept: NURSING | Facility: CLINIC | Age: 50
End: 2020-12-14

## 2020-12-14 LAB
ALT SERPL-CCNC: 9 IU/L (ref 8–45)
AST SERPL-CCNC: 16 IU/L (ref 2–40)
CREAT SERPL-MCNC: 0.91 MG/DL (ref 0.57–1.11)
GFR SERPL CREATININE-BSD FRML MDRD: >60 ML/MIN/1.73M2
GLUCOSE SERPL-MCNC: 95 MG/DL (ref 65–100)
POTASSIUM SERPL-SCNC: 5.2 MMOL/L (ref 3.5–5)

## 2020-12-14 NOTE — TELEPHONE ENCOUNTER
Patient calling to get an order for fluids.  Refusing ER and triage.    She would like call back from pcp team.  I did advise her fluids are usually not give in clinic setting.    She is asking for call back.    Trinity Ramirez RN  [facility] Nurse Advisor

## 2020-12-15 ENCOUNTER — TELEPHONE (OUTPATIENT)
Dept: FAMILY MEDICINE | Facility: CLINIC | Age: 50
End: 2020-12-15

## 2020-12-15 NOTE — TELEPHONE ENCOUNTER
"JAYME  Spoke with patient, she did go to [facility] yesterday and received fluids, \"2 bags\".  She reports she becomes dehydrated at times.  When this happens, she will have symptoms of being hot and cold, abdominal bloating and nausea. Then will have a sudden \"black out episode\"  No fever, she does eat and drink.  She feels her menopausal night sweats play a part in this.  She is feeling well today. \"100 % better\"  She is taking her Effexor, which she feels is helpful for the night sweats  The promethazine is not helpful.  She was given a medication in the hospital for nausea that was very helpful and she will  a script of this today.  She will call clinic with name of medication and dosing once she gets from pharmacy.  Mirtha Salgado RN  [facility]    "

## 2020-12-15 NOTE — TELEPHONE ENCOUNTER
Pt dropped off slip from [facility] stating she is now taking Zofran 4 mg and wanted to notify Nikki.     Slip left in Nikki's basket.

## 2020-12-16 RX ORDER — ONDANSETRON 4 MG/1
4 TABLET, ORALLY DISINTEGRATING ORAL EVERY 8 HOURS PRN
Status: CANCELLED | COMMUNITY
Start: 2020-12-16

## 2020-12-23 ENCOUNTER — VIRTUAL VISIT (OUTPATIENT)
Dept: FAMILY MEDICINE | Facility: CLINIC | Age: 50
End: 2020-12-23
Payer: COMMERCIAL

## 2020-12-23 DIAGNOSIS — N95.1 MENOPAUSAL SYNDROME (HOT FLASHES): ICD-10-CM

## 2020-12-23 PROCEDURE — 99213 OFFICE O/P EST LOW 20 MIN: CPT | Mod: 95 | Performed by: PHYSICIAN ASSISTANT

## 2020-12-23 ASSESSMENT — ANXIETY QUESTIONNAIRES
GAD7 TOTAL SCORE: 5
3. WORRYING TOO MUCH ABOUT DIFFERENT THINGS: MORE THAN HALF THE DAYS
5. BEING SO RESTLESS THAT IT IS HARD TO SIT STILL: NOT AT ALL
1. FEELING NERVOUS, ANXIOUS, OR ON EDGE: NOT AT ALL
IF YOU CHECKED OFF ANY PROBLEMS ON THIS QUESTIONNAIRE, HOW DIFFICULT HAVE THESE PROBLEMS MADE IT FOR YOU TO DO YOUR WORK, TAKE CARE OF THINGS AT HOME, OR GET ALONG WITH OTHER PEOPLE: NOT DIFFICULT AT ALL
6. BECOMING EASILY ANNOYED OR IRRITABLE: NEARLY EVERY DAY
2. NOT BEING ABLE TO STOP OR CONTROL WORRYING: NOT AT ALL
7. FEELING AFRAID AS IF SOMETHING AWFUL MIGHT HAPPEN: NOT AT ALL

## 2020-12-23 ASSESSMENT — PATIENT HEALTH QUESTIONNAIRE - PHQ9
5. POOR APPETITE OR OVEREATING: NOT AT ALL
SUM OF ALL RESPONSES TO PHQ QUESTIONS 1-9: 1

## 2020-12-23 NOTE — PROGRESS NOTES
"Joellen Richter is a 50 year old female who is being evaluated via a billable telephone visit.      The patient has been notified of following:     \"This telephone visit will be conducted via a call between you and your physician/provider. We have found that certain health care needs can be provided without the need for a physical exam.  This service lets us provide the care you need with a short phone conversation.  If a prescription is necessary we can send it directly to your pharmacy.  If lab work is needed we can place an order for that and you can then stop by our lab to have the test done at a later time.    Telephone visits are billed at different rates depending on your insurance coverage. During this emergency period, for some insurers they may be billed the same as an in-person visit.  Please reach out to your insurance provider with any questions.    If during the course of the call the physician/provider feels a telephone visit is not appropriate, you will not be charged for this service.\"    Patient has given verbal consent for Telephone visit?  Yes    What phone number would you like to be contacted at? [phone]    How would you like to obtain your AVS? Declined    Subjective     Joellen Richter is a 50 year old female who presents via phone visit today for the following health issues:    HPI     Depression and Anxiety Follow-Up    How are you doing with your depression since your last visit? Improved     How are you doing with your anxiety since your last visit?  No change    Are you having other symptoms that might be associated with depression or anxiety? No    Have you had a significant life event? No     Do you have any concerns with your use of alcohol or other drugs? No     Started on Effexor 37.5mg at last visit   Helping with hot flashes and mood      Surgery follow-up, jaw surgery 12/21/20  Dental work      Social History     Tobacco Use     Smoking status: Current Every Day " Smoker     Packs/day: 0.50     Years: 10.00     Pack years: 5.00     Types: Cigarettes     Smokeless tobacco: Never Used   Substance Use Topics     Alcohol use: Yes     Comment:  3-4 beers per week- quit drinking in 2/2011     Drug use: No     PHQ 6/22/2018 11/27/2020 12/23/2020   PHQ-9 Total Score 5 12 1   Q9: Thoughts of better off dead/self-harm past 2 weeks Not at all Several days Not at all     CHON-7 SCORE 6/22/2018 11/27/2020 12/23/2020   Total Score - - -   Total Score 7 5 5     Last PHQ-9 12/23/2020   1.  Little interest or pleasure in doing things 0   2.  Feeling down, depressed, or hopeless 0   3.  Trouble falling or staying asleep, or sleeping too much 0   4.  Feeling tired or having little energy 1   5.  Poor appetite or overeating 0   6.  Feeling bad about yourself 0   7.  Trouble concentrating 0   8.  Moving slowly or restless 0   Q9: Thoughts of better off dead/self-harm past 2 weeks 0   PHQ-9 Total Score 1   Difficulty at work, home, or with people Not difficult at all     CHON-7  12/23/2020   1. Feeling nervous, anxious, or on edge 0   2. Not being able to stop or control worrying 0   3. Worrying too much about different things 2   4. Trouble relaxing 0   5. Being so restless that it is hard to sit still 0   6. Becoming easily annoyed or irritable 3   7. Feeling afraid, as if something awful might happen 0   CHON-7 Total Score 5   If you checked any problems, how difficult have they made it for you to do your work, take care of things at home, or get along with other people? Not difficult at all       Suicide Assessment Five-step Evaluation and Treatment (SAFE-T)      How many servings of fruits and vegetables do you eat daily?  2-3    On average, how many sweetened beverages do you drink each day (Examples: soda, juice, sweet tea, etc.  Do NOT count diet or artificially sweetened beverages)?   0    How many days per week do you exercise enough to make your heart beat faster? None    How many minutes  a day do you exercise enough to make your heart beat faster? None    How many days per week do you miss taking your medication? 0    Surgery follow-up, jaw surgery 12/21/20      Review of Systems   Constitutional, and psych systems are negative, except as otherwise noted.       Objective          Vitals:  No vitals were obtained today due to virtual visit.    healthy, alert and no distress  PSYCH: Alert and oriented times 3; coherent speech, normal   rate and volume, able to articulate logical thoughts, able   to abstract reason, no tangential thoughts, no hallucinations   or delusions  Her affect is normal and pleasant  RESP: No cough, no audible wheezing, able to talk in full sentences  Remainder of exam unable to be completed due to telephone visits        Assessment/Plan:    Assessment & Plan     1. Menopausal syndrome (hot flashes)        Continue Effexor, no changes today. This has been helping with her hot flashes.     We also discussed her positive HPV screen with her pap. Patient given number to call and schedule a colposcopy.         Return in about 11 months (around 11/23/2021) for your annual physical, a med check, repeat labs.    Nikki Castro PA-C  [facility]    Phone call duration:  8 minutes

## 2020-12-24 ASSESSMENT — ANXIETY QUESTIONNAIRES: GAD7 TOTAL SCORE: 5

## 2021-04-13 ENCOUNTER — OFFICE VISIT (OUTPATIENT)
Dept: FAMILY MEDICINE | Facility: CLINIC | Age: 51
End: 2021-04-13
Payer: COMMERCIAL

## 2021-04-13 VITALS
DIASTOLIC BLOOD PRESSURE: 88 MMHG | BODY MASS INDEX: 20.78 KG/M2 | HEIGHT: 66 IN | WEIGHT: 129.31 LBS | SYSTOLIC BLOOD PRESSURE: 130 MMHG | OXYGEN SATURATION: 96 % | RESPIRATION RATE: 18 BRPM | HEART RATE: 68 BPM | TEMPERATURE: 98.6 F

## 2021-04-13 DIAGNOSIS — R42 LIGHTHEADEDNESS: ICD-10-CM

## 2021-04-13 DIAGNOSIS — N95.1 MENOPAUSAL SYNDROME (HOT FLASHES): Primary | ICD-10-CM

## 2021-04-13 LAB
ALBUMIN SERPL-MCNC: 4.1 G/DL (ref 3.4–5)
ALBUMIN UR-MCNC: NEGATIVE MG/DL
ALP SERPL-CCNC: 81 U/L (ref 40–150)
ALT SERPL W P-5'-P-CCNC: 33 U/L (ref 0–50)
ANION GAP SERPL CALCULATED.3IONS-SCNC: 4 MMOL/L (ref 3–14)
APPEARANCE UR: CLEAR
AST SERPL W P-5'-P-CCNC: 22 U/L (ref 0–45)
BILIRUB SERPL-MCNC: 0.7 MG/DL (ref 0.2–1.3)
BILIRUB UR QL STRIP: ABNORMAL
BUN SERPL-MCNC: 11 MG/DL (ref 7–30)
CALCIUM SERPL-MCNC: 9.1 MG/DL (ref 8.5–10.1)
CHLORIDE SERPL-SCNC: 107 MMOL/L (ref 94–109)
CO2 SERPL-SCNC: 27 MMOL/L (ref 20–32)
COLOR UR AUTO: YELLOW
CREAT SERPL-MCNC: 0.95 MG/DL (ref 0.52–1.04)
ERYTHROCYTE [DISTWIDTH] IN BLOOD BY AUTOMATED COUNT: 13.7 % (ref 10–15)
GFR SERPL CREATININE-BSD FRML MDRD: 70 ML/MIN/{1.73_M2}
GLUCOSE SERPL-MCNC: 98 MG/DL (ref 70–99)
GLUCOSE UR STRIP-MCNC: NEGATIVE MG/DL
HCT VFR BLD AUTO: 44.4 % (ref 35–47)
HGB BLD-MCNC: 14.7 G/DL (ref 11.7–15.7)
HGB UR QL STRIP: NEGATIVE
KETONES UR STRIP-MCNC: ABNORMAL MG/DL
LEUKOCYTE ESTERASE UR QL STRIP: NEGATIVE
MCH RBC QN AUTO: 31.2 PG (ref 26.5–33)
MCHC RBC AUTO-ENTMCNC: 33.1 G/DL (ref 31.5–36.5)
MCV RBC AUTO: 94 FL (ref 78–100)
NITRATE UR QL: NEGATIVE
PH UR STRIP: 5.5 PH (ref 5–7)
PLATELET # BLD AUTO: 227 10E9/L (ref 150–450)
POTASSIUM SERPL-SCNC: 4.3 MMOL/L (ref 3.4–5.3)
PROT SERPL-MCNC: 7.5 G/DL (ref 6.8–8.8)
RBC # BLD AUTO: 4.71 10E12/L (ref 3.8–5.2)
SODIUM SERPL-SCNC: 138 MMOL/L (ref 133–144)
SOURCE: ABNORMAL
SP GR UR STRIP: >1.03 (ref 1–1.03)
T4 FREE SERPL-MCNC: 1.06 NG/DL (ref 0.76–1.46)
TSH SERPL DL<=0.005 MIU/L-ACNC: 4.49 MU/L (ref 0.4–4)
UROBILINOGEN UR STRIP-ACNC: 1 EU/DL (ref 0.2–1)
WBC # BLD AUTO: 7.4 10E9/L (ref 4–11)

## 2021-04-13 PROCEDURE — 85027 COMPLETE CBC AUTOMATED: CPT | Performed by: PHYSICIAN ASSISTANT

## 2021-04-13 PROCEDURE — 80053 COMPREHEN METABOLIC PANEL: CPT | Performed by: PHYSICIAN ASSISTANT

## 2021-04-13 PROCEDURE — 81003 URINALYSIS AUTO W/O SCOPE: CPT | Performed by: PHYSICIAN ASSISTANT

## 2021-04-13 PROCEDURE — 36415 COLL VENOUS BLD VENIPUNCTURE: CPT | Performed by: PHYSICIAN ASSISTANT

## 2021-04-13 PROCEDURE — 84439 ASSAY OF FREE THYROXINE: CPT | Performed by: PHYSICIAN ASSISTANT

## 2021-04-13 PROCEDURE — 84443 ASSAY THYROID STIM HORMONE: CPT | Performed by: PHYSICIAN ASSISTANT

## 2021-04-13 PROCEDURE — 99214 OFFICE O/P EST MOD 30 MIN: CPT | Performed by: PHYSICIAN ASSISTANT

## 2021-04-13 ASSESSMENT — ENCOUNTER SYMPTOMS
SORE THROAT: 0
LIGHT-HEADEDNESS: 1
SHORTNESS OF BREATH: 0
FEVER: 0
ABDOMINAL PAIN: 0
COUGH: 0
NERVOUS/ANXIOUS: 0

## 2021-04-13 ASSESSMENT — MIFFLIN-ST. JEOR: SCORE: 1218.31

## 2021-04-13 NOTE — PATIENT INSTRUCTIONS
Please continue your prescribed medications, including Zoloft, as this can help with menopause side effects.    We are checking lab work today and will be contacting you once all results are available.  This will likely be later tomorrow.    Your symptoms are possibly related to dehydration.  Please purchase a water bottle that has markings to indicate how many ounces you have drink.  I would like you to drink 64 ounces per day.      Patient Education     Coping with Symptoms of Menopause  What symptoms of menopause may occur with my treatment?  Chemotherapy drugs or medicines that block hormones may bring on menopause.  These changes may include:    Hot flashes    Vaginal dryness    Trouble falling asleep (insomnia)    Headache    Depression.  How are hot flashes treated?  Your doctor may suggest medicine to control the hot flashes. Vitamins E, B6 or C may also help. Talk to your doctor about how much to take.  Some herbs or foods may help: primrose oil, garlic, chamomile, ginseng root, royal jelly or soy.  What else can I do to cope with hot flashes?    Wear clothes made of natural fabric such as cotton.    Dress in layers. You can remove them when you feel too warm.    Avoid hot drinks (coffee or tea) and spicy foods.    Keep the room temperature low if you can.    Control your stress. Exercise and relaxation techniques may help.    Keep track of when and how often hot flashes occur. Note what is happening right before a hot flash. This may give you some control over future hot flashes.  How is vaginal dryness treated?    You can try drugstore products such as Replens, Gyne-Moistrin or Lubrin. They last for about three days.    Use water-based lubricants during sex. These include Astroglide and K-Y Jelly. Do not use Vaseline or petroleum jelly because they are not good for vaginal tissues.    Use low-dose estrogen cream in the vagina. Your doctor must prescribe this medicine.  How can I cope if I have trouble  sleeping?    Get in bed when you are ready to go to sleep. Do not watch TV or read in bed.    Follow a sleeping routine: Go to bed and get up at the same times. Get up on time even if you did not sleep well.    If you do not fall asleep within 30 minutes, get up.    Get regular exercise. Do not exercise right before bedtime.    Avoid alcohol. It may disrupt your sleep.    Try natural remedies just before bedtime such as warm milk, chamomile tea or verbena tea.  How can I treat headache?  Ask your doctor if it is safe to take aspirin, Tylenol (acetaminophen) or Advil (ibuprofen). They may cause side effects when mixed with chemotherapy.  How can I cope with depression?  Mild depression    Start an exercise program. Exercise with a friend. Any activity is better than none. Walk to the mailbox, to see your neighbors or in the mall.    Share your feelings with family and friends.    Don't give in to negative feelings.  Severe depression  If your depression lasts longer than two weeks, talk to your care team. They may suggest counseling or medicine.  Comments:  __________________________________________  __________________________________________  __________________________________________  __________________________________________  __________________________________________  __________________________________________  __________________________________________  __________________________________________  For informational purposes only. Not to replace the advice of your health care provider.  Copyright   2010 [facility]. All rights reserved. [facility] 621061 - 12/15.           Patient Education     Dehydration (Adult)  Dehydration occurs when your body loses too much fluid. This may be the result of prolonged vomiting or diarrhea, excessive sweating, or a high fever. It may also happen if you don t drink enough fluid when you re sick or out in the heat. Misuse of diuretics (water pills) can also be a  cause.   Symptoms include thirst, decreased urine output, and darker colored urine. You may also feel dizzy, weak, fatigued, or very drowsy. The diet described below is usually enough to treat dehydration. In some cases, you may need medicine.   Home care    Drink at least 12, 8-ounce glasses of fluid every day to resolve the dehydration. Fluid may include water; orange juice; lemonade; apple, grape, or cranberry juice; clear fruit drinks; electrolyte replacement and sports drinks; and teas and coffee without caffeine. Don't drink alcohol. If you have been diagnosed with a kidney disease, ask your doctor how much and what types of fluids you should drink to prevent dehydration. If you have kidney disease, fluid can build up in the body. This can be dangerous to your health.    If you have a fever, muscle aches, or a headache as a result of a cold or flu, you may take acetaminophen or ibuprofen, unless another medicine was prescribed. If you have chronic liver or kidney disease, or have ever had a stomach ulcer or gastrointestinal bleeding, talk with your healthcare provider before using these medicines. Don't take aspirin if you are younger than 18 and have a fever. In children with fever, aspirin raises the chance for severe liver injury and death.    Follow-up care  Follow up with your healthcare provider, or as advised.   When to seek medical advice  Call your healthcare provider right away if any of these occur:     Continued vomiting    Frequent diarrhea (more than 5 times a day); blood (red or black color) or mucus in diarrhea    Swollen abdomen or increasing abdominal pain    Reduced urine output or extreme thirst    Fever of 100.4 F (38 C) or higher  Call 911  Call 911 or get medical care right away if you have any of the following:     Weakness, dizziness, or fainting    Unusual drowsiness or confusion    Blood in vomit or stool  Jean last reviewed this educational content on 12/1/2019 2000-2021 [employer]  [employer]. All rights reserved. This information is not intended as a substitute for professional medical care. Always follow your healthcare professional's instructions.

## 2021-04-13 NOTE — PROGRESS NOTES
"    Assessment & Plan     Menopausal syndrome (hot flashes)  Lightheadedness  Patient is a 51-year-old female who presents to clinic due to 10 years of menopausal hot flashes where patient feels a sudden drop in blood pressure, sweating, nausea, vomiting, and lightheadedness.  Patient also notes that she is likely severely dehydrated because she does not drink water, and only drinks coffee.  Patient has been compliant with prescribed Zoloft for hot flashes.    We will recheck labs today.  Discussed the importance of drinking water and recommended patient carry water bottle.  Patient to attempt to drink 64 ounces per day.  Provided return precautions and symptoms that warrant emergent follow-up.    - CBC with platelets  - TSH with free T4 reflex  - Comprehensive metabolic panel (BMP + Alb, Alk Phos, ALT, AST, Total. Bili, TP)  - *UA reflex to Microscopic and Culture ([place] and [facility] (except [place] and [place])       See Patient Instructions    Return in about 2 weeks (around 4/27/2021), or if symptoms worsen or fail to improve.    LANCE Mason [facility] NICANOR Cuenca is a 51 year old who presents for the following health issues     HPI     - Discuss hormone changes. Patient notes that for 10 years with menopause hot flashes she noticed blood pressure drops frequently causing excessive sweating, nausea, emesis and hot/cold flashes. She notes that she has passed out and LOC and falls due to this. She states that this has been going on for years and is worsening with time. Last episode of this happened yesterday when she was driving, she notes that she had to pull the car over until feeling better. She notes feeling nauseated and \"off feeling\" today in clinic. Episodes of this typically last about 30 minutes - 4 hours.     She notes being dehydrated and that she does not drink water, she does drink 3 cups of coffee per day. She is eating a regular diet but notes " "that she is never hungry. She notes a weight loss of 4lb over the past weekend and thinks it is due to sweating. She does struggle with constipation and only has BM 2 times per week. She states that she has noticed fatigue, hair loss and tremors that occur with episodes of sweating and nausea. She denies headache, dry skin, loose stools, extreme thirst.     She notes that depression/anxiety has been stable and that she is taking Zoloft 150mg every day without side effects. She is checking blood pressure at home and states that this averages 120s/50-60s, she has not checked with when having an episode. She smokes 10 cigarettes per day. She does have family history of diabetes type 2, father. She denies any known family history of thyroid issues. She notes having hysterectomy 15+ years ago.    Wt Readings from Last 4 Encounters:   04/13/21 58.7 kg (129 lb 5 oz)   11/27/20 58.2 kg (128 lb 3.2 oz)   06/24/19 67.3 kg (148 lb 6.4 oz)   06/22/18 74.6 kg (164 lb 6.4 oz)       BP Readings from Last 6 Encounters:   04/13/21 130/88   11/27/20 126/76   06/24/19 142/80   09/18/17 123/71   06/05/17 128/76   11/17/16 110/77          Review of Systems   Constitutional: Negative for fever.   HENT: Negative for congestion and sore throat.    Respiratory: Negative for cough and shortness of breath.    Cardiovascular: Negative for chest pain.   Gastrointestinal: Negative for abdominal pain.   Skin: Negative for rash.   Neurological: Positive for light-headedness.   Psychiatric/Behavioral: The patient is not nervous/anxious.             Objective    /88   Pulse 68   Temp 98.6  F (37  C) (Tympanic)   Resp 18   Ht 1.676 m (5' 6\")   Wt 58.7 kg (129 lb 5 oz)   LMP 07/20/2008   SpO2 96%   BMI 20.87 kg/m    Body mass index is 20.87 kg/m .  Physical Exam  Vitals signs and nursing note reviewed.   Constitutional:       General: She is not in acute distress.     Appearance: Normal appearance.   HENT:      Head: Normocephalic and " atraumatic.      Mouth/Throat:      Mouth: Mucous membranes are moist.      Pharynx: Oropharynx is clear.   Eyes:      Extraocular Movements: Extraocular movements intact.      Pupils: Pupils are equal, round, and reactive to light.   Neck:      Musculoskeletal: Normal range of motion.   Cardiovascular:      Rate and Rhythm: Normal rate and regular rhythm.      Heart sounds: Normal heart sounds.   Pulmonary:      Effort: Pulmonary effort is normal.      Breath sounds: Normal breath sounds.   Musculoskeletal: Normal range of motion.   Skin:     General: Skin is warm and dry.   Neurological:      General: No focal deficit present.      Mental Status: She is alert.      Sensory: Sensation is intact.      Motor: Motor function is intact.      Coordination: Coordination is intact.      Gait: Gait is intact.   Psychiatric:         Mood and Affect: Mood normal.         Behavior: Behavior normal.            UA, CBC, CMP, TSH pending

## 2021-07-09 NOTE — TELEPHONE ENCOUNTER
FYI to provider - Patient is lost to pap tracking follow-up. Attempts to contact pt have been made per reminder process and there has been no reply and/or no appt scheduled.       2008, 2011, 2015 NIL paps.  11/27/20 NIL Pap, + HR HPV 16 (neg 18 & other). [place] due by 2/27/2021.   12/3/20 LM for pt to call us back.   12/9/20 Message left to return call.   12/15/20 Letter sent  12/21/20 Pt notified.   1/26/21 Reminder letter  2/26/21 [place] not done. Tracking updated for 6 mo colp/pap due by 5/27/21.   5/11/21 Reminder letter  6/9/21 Reminder call  7/9/21 Lost to follow up

## 2021-10-21 DIAGNOSIS — F51.02 ADJUSTMENT INSOMNIA: ICD-10-CM

## 2021-10-24 RX ORDER — TRAZODONE HYDROCHLORIDE 100 MG/1
100-200 TABLET ORAL AT BEDTIME
Qty: 60 TABLET | Refills: 0 | Status: SHIPPED | OUTPATIENT
Start: 2021-10-24 | End: 2022-02-15
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
## 2022-02-15 ENCOUNTER — OFFICE VISIT (OUTPATIENT)
Dept: FAMILY MEDICINE | Facility: CLINIC | Age: 52
End: 2022-02-15
Payer: COMMERCIAL

## 2022-02-15 VITALS
RESPIRATION RATE: 16 BRPM | BODY MASS INDEX: 21.99 KG/M2 | HEART RATE: 75 BPM | TEMPERATURE: 98.4 F | OXYGEN SATURATION: 96 % | DIASTOLIC BLOOD PRESSURE: 69 MMHG | SYSTOLIC BLOOD PRESSURE: 119 MMHG | HEIGHT: 65 IN | WEIGHT: 132 LBS

## 2022-02-15 DIAGNOSIS — Z71.89 ADVANCE CARE PLANNING: ICD-10-CM

## 2022-02-15 DIAGNOSIS — F51.02 ADJUSTMENT INSOMNIA: ICD-10-CM

## 2022-02-15 DIAGNOSIS — N95.1 MENOPAUSAL SYNDROME (HOT FLASHES): ICD-10-CM

## 2022-02-15 DIAGNOSIS — F41.9 ANXIETY AND DEPRESSION: ICD-10-CM

## 2022-02-15 DIAGNOSIS — F31.9 BIPOLAR AFFECTIVE DISORDER, REMISSION STATUS UNSPECIFIED (H): ICD-10-CM

## 2022-02-15 DIAGNOSIS — Z00.00 ROUTINE GENERAL MEDICAL EXAMINATION AT A HEALTH CARE FACILITY: Primary | ICD-10-CM

## 2022-02-15 DIAGNOSIS — Z12.31 VISIT FOR SCREENING MAMMOGRAM: ICD-10-CM

## 2022-02-15 DIAGNOSIS — Z20.828 EXPOSURE TO HERPES SIMPLEX VIRUS (HSV): ICD-10-CM

## 2022-02-15 DIAGNOSIS — R79.89 ELEVATED TSH: ICD-10-CM

## 2022-02-15 DIAGNOSIS — F32.A ANXIETY AND DEPRESSION: ICD-10-CM

## 2022-02-15 DIAGNOSIS — R11.2 NON-INTRACTABLE VOMITING WITH NAUSEA, UNSPECIFIED VOMITING TYPE: ICD-10-CM

## 2022-02-15 LAB
HSV1 IGG SERPL QL IA: 60.4 INDEX
HSV1 IGG SERPL QL IA: ABNORMAL
HSV2 IGG SERPL QL IA: 0.17 INDEX
HSV2 IGG SERPL QL IA: ABNORMAL
TSH SERPL DL<=0.005 MIU/L-ACNC: 1.9 MU/L (ref 0.4–4)

## 2022-02-15 PROCEDURE — 84443 ASSAY THYROID STIM HORMONE: CPT | Performed by: PHYSICIAN ASSISTANT

## 2022-02-15 PROCEDURE — 86696 HERPES SIMPLEX TYPE 2 TEST: CPT | Performed by: PHYSICIAN ASSISTANT

## 2022-02-15 PROCEDURE — G0145 SCR C/V CYTO,THINLAYER,RESCR: HCPCS | Performed by: PHYSICIAN ASSISTANT

## 2022-02-15 PROCEDURE — 36415 COLL VENOUS BLD VENIPUNCTURE: CPT | Performed by: PHYSICIAN ASSISTANT

## 2022-02-15 PROCEDURE — 99214 OFFICE O/P EST MOD 30 MIN: CPT | Mod: 25 | Performed by: PHYSICIAN ASSISTANT

## 2022-02-15 PROCEDURE — 86695 HERPES SIMPLEX TYPE 1 TEST: CPT | Performed by: PHYSICIAN ASSISTANT

## 2022-02-15 PROCEDURE — 96127 BRIEF EMOTIONAL/BEHAV ASSMT: CPT | Performed by: PHYSICIAN ASSISTANT

## 2022-02-15 PROCEDURE — 99396 PREV VISIT EST AGE 40-64: CPT | Performed by: PHYSICIAN ASSISTANT

## 2022-02-15 PROCEDURE — 87624 HPV HI-RISK TYP POOLED RSLT: CPT | Performed by: PHYSICIAN ASSISTANT

## 2022-02-15 RX ORDER — PROMETHAZINE HYDROCHLORIDE 25 MG/1
12.5-25 TABLET ORAL EVERY 6 HOURS PRN
Qty: 30 TABLET | Refills: 3 | Status: SHIPPED | OUTPATIENT
Start: 2022-02-15 | End: 2023-04-24

## 2022-02-15 RX ORDER — TRAZODONE HYDROCHLORIDE 100 MG/1
100-200 TABLET ORAL AT BEDTIME
Qty: 180 TABLET | Refills: 2 | Status: SHIPPED | OUTPATIENT
Start: 2022-02-15 | End: 2023-04-24

## 2022-02-15 RX ORDER — CLONAZEPAM 0.5 MG/1
0.5 TABLET ORAL 2 TIMES DAILY PRN
Qty: 60 TABLET | Refills: 1 | Status: SHIPPED | OUTPATIENT
Start: 2022-02-15 | End: 2023-04-24

## 2022-02-15 RX ORDER — ESTRADIOL 0.5 MG/1
0.5 TABLET ORAL DAILY
Qty: 90 TABLET | Refills: 3 | Status: SHIPPED | OUTPATIENT
Start: 2022-02-15 | End: 2023-04-24

## 2022-02-15 RX ORDER — SERTRALINE HYDROCHLORIDE 100 MG/1
200 TABLET, FILM COATED ORAL DAILY
Qty: 180 TABLET | Refills: 3 | Status: SHIPPED | OUTPATIENT
Start: 2022-02-15 | End: 2023-04-24

## 2022-02-15 ASSESSMENT — PATIENT HEALTH QUESTIONNAIRE - PHQ9
SUM OF ALL RESPONSES TO PHQ QUESTIONS 1-9: 4
10. IF YOU CHECKED OFF ANY PROBLEMS, HOW DIFFICULT HAVE THESE PROBLEMS MADE IT FOR YOU TO DO YOUR WORK, TAKE CARE OF THINGS AT HOME, OR GET ALONG WITH OTHER PEOPLE: NOT DIFFICULT AT ALL
SUM OF ALL RESPONSES TO PHQ QUESTIONS 1-9: 4

## 2022-02-15 ASSESSMENT — ENCOUNTER SYMPTOMS
MYALGIAS: 1
BREAST MASS: 0
DIARRHEA: 0
HEMATURIA: 0
FREQUENCY: 1
FEVER: 0
ARTHRALGIAS: 1
NERVOUS/ANXIOUS: 1
CONSTIPATION: 1
JOINT SWELLING: 0
SHORTNESS OF BREATH: 0
WEAKNESS: 0
DYSURIA: 1
HEADACHES: 0
HEARTBURN: 0
CHILLS: 0
DIZZINESS: 0
EYE PAIN: 0
COUGH: 0
NAUSEA: 0
PALPITATIONS: 0
HEMATOCHEZIA: 0
ABDOMINAL PAIN: 1
PARESTHESIAS: 0
SORE THROAT: 0

## 2022-02-15 ASSESSMENT — ANXIETY QUESTIONNAIRES
2. NOT BEING ABLE TO STOP OR CONTROL WORRYING: SEVERAL DAYS
GAD7 TOTAL SCORE: 4
GAD7 TOTAL SCORE: 4
5. BEING SO RESTLESS THAT IT IS HARD TO SIT STILL: NOT AT ALL
7. FEELING AFRAID AS IF SOMETHING AWFUL MIGHT HAPPEN: NOT AT ALL
3. WORRYING TOO MUCH ABOUT DIFFERENT THINGS: SEVERAL DAYS
6. BECOMING EASILY ANNOYED OR IRRITABLE: SEVERAL DAYS
1. FEELING NERVOUS, ANXIOUS, OR ON EDGE: SEVERAL DAYS
GAD7 TOTAL SCORE: 4
7. FEELING AFRAID AS IF SOMETHING AWFUL MIGHT HAPPEN: NOT AT ALL
4. TROUBLE RELAXING: NOT AT ALL

## 2022-02-15 ASSESSMENT — PAIN SCALES - GENERAL: PAINLEVEL: MODERATE PAIN (5)

## 2022-02-15 ASSESSMENT — MIFFLIN-ST. JEOR: SCORE: 1210.24

## 2022-02-16 ENCOUNTER — TELEPHONE (OUTPATIENT)
Dept: FAMILY MEDICINE | Facility: CLINIC | Age: 52
End: 2022-02-16
Payer: COMMERCIAL

## 2022-02-16 ASSESSMENT — ANXIETY QUESTIONNAIRES: GAD7 TOTAL SCORE: 4

## 2022-02-16 NOTE — TELEPHONE ENCOUNTER
Please call patient with the following info:    Thyroid is in normal range  HSV titer is positive for type 1 - this means she has been exposed to the virus and has antibodies. Doesn't necessarily mean she'll ever have an outbreak

## 2022-02-16 NOTE — TELEPHONE ENCOUNTER
Notified patient of results below per provider.    Patient stated understanding and agreeable with the plan of care.     Marium RN,BSN  Triage Nurse  [facility]: [facility]

## 2022-02-17 LAB
BKR LAB AP GYN ADEQUACY: NORMAL
BKR LAB AP GYN INTERPRETATION: NORMAL
BKR LAB AP HPV REFLEX: NORMAL
BKR LAB AP LMP: NORMAL
BKR LAB AP PREVIOUS ABNL DX: NORMAL
BKR LAB AP PREVIOUS ABNORMAL: NORMAL
PATH REPORT.COMMENTS IMP SPEC: NORMAL
PATH REPORT.COMMENTS IMP SPEC: NORMAL
PATH REPORT.RELEVANT HX SPEC: NORMAL

## 2022-02-21 ENCOUNTER — PATIENT OUTREACH (OUTPATIENT)
Dept: FAMILY MEDICINE | Facility: CLINIC | Age: 52
End: 2022-02-21
Payer: COMMERCIAL

## 2022-02-21 LAB
HUMAN PAPILLOMA VIRUS 16 DNA: NEGATIVE
HUMAN PAPILLOMA VIRUS 18 DNA: NEGATIVE
HUMAN PAPILLOMA VIRUS FINAL DIAGNOSIS: NORMAL
HUMAN PAPILLOMA VIRUS OTHER HR: NEGATIVE

## 2022-02-21 NOTE — LETTER
February 21, 2022      Joellen Richter  [address] [address]        Dear ,    We are happy to inform you that your recent Pap smear and Human Papillomavirus (HPV) test results are normal and negative.    It is recommended that you have your next Pap smear and Human Papillomavirus (HPV) test in 1 year. You will also need to return to the clinic every year for an annual wellness visit.    If you have additional questions regarding this result, please contact our office and we will be happy to assist you.      Sincerely,    Your [facility] Care Team

## 2023-01-25 ENCOUNTER — PATIENT OUTREACH (OUTPATIENT)
Dept: FAMILY MEDICINE | Facility: CLINIC | Age: 53
End: 2023-01-25

## 2023-01-25 DIAGNOSIS — R87.810 CERVICAL HIGH RISK HPV (HUMAN PAPILLOMAVIRUS) TEST POSITIVE: ICD-10-CM

## 2023-01-25 NOTE — LETTER
January 25, 2023      Joellen Richter  [address] [address] [address]        Dear ,    This letter is to remind you that you are due for your follow-up Pap smear and Human Papillomavirus (HPV) test.    Please call [phone] to schedule your appointment at your earliest convenience.    If you have completed the appointment outside of the [facility] system, please have the records forwarded to our office. We will update your chart for your provider to review before your next annual wellness visit.     Thank you for choosing [facility]!      Sincerely,    Your [facility] Care Team

## 2023-04-24 ENCOUNTER — OFFICE VISIT (OUTPATIENT)
Dept: FAMILY MEDICINE | Facility: CLINIC | Age: 53
End: 2023-04-24
Payer: COMMERCIAL

## 2023-04-24 VITALS
BODY MASS INDEX: 22.02 KG/M2 | HEART RATE: 72 BPM | SYSTOLIC BLOOD PRESSURE: 130 MMHG | TEMPERATURE: 98.5 F | HEIGHT: 66 IN | RESPIRATION RATE: 14 BRPM | OXYGEN SATURATION: 97 % | WEIGHT: 137 LBS | DIASTOLIC BLOOD PRESSURE: 80 MMHG

## 2023-04-24 DIAGNOSIS — Z00.00 ROUTINE GENERAL MEDICAL EXAMINATION AT A HEALTH CARE FACILITY: Primary | ICD-10-CM

## 2023-04-24 DIAGNOSIS — F51.02 ADJUSTMENT INSOMNIA: ICD-10-CM

## 2023-04-24 DIAGNOSIS — F32.A ANXIETY AND DEPRESSION: ICD-10-CM

## 2023-04-24 DIAGNOSIS — K59.04 CHRONIC IDIOPATHIC CONSTIPATION: ICD-10-CM

## 2023-04-24 DIAGNOSIS — R11.0 NAUSEA: ICD-10-CM

## 2023-04-24 DIAGNOSIS — E03.4 HYPOTHYROIDISM DUE TO ACQUIRED ATROPHY OF THYROID: ICD-10-CM

## 2023-04-24 DIAGNOSIS — F41.9 ANXIETY AND DEPRESSION: ICD-10-CM

## 2023-04-24 DIAGNOSIS — F31.9 BIPOLAR AFFECTIVE DISORDER, REMISSION STATUS UNSPECIFIED (H): ICD-10-CM

## 2023-04-24 DIAGNOSIS — N95.1 MENOPAUSAL SYNDROME (HOT FLASHES): ICD-10-CM

## 2023-04-24 PROCEDURE — 99396 PREV VISIT EST AGE 40-64: CPT | Performed by: PHYSICIAN ASSISTANT

## 2023-04-24 PROCEDURE — 87624 HPV HI-RISK TYP POOLED RSLT: CPT | Performed by: PHYSICIAN ASSISTANT

## 2023-04-24 PROCEDURE — G0145 SCR C/V CYTO,THINLAYER,RESCR: HCPCS | Performed by: PHYSICIAN ASSISTANT

## 2023-04-24 PROCEDURE — 99214 OFFICE O/P EST MOD 30 MIN: CPT | Mod: 25 | Performed by: PHYSICIAN ASSISTANT

## 2023-04-24 RX ORDER — SERTRALINE HYDROCHLORIDE 100 MG/1
200 TABLET, FILM COATED ORAL DAILY
Qty: 180 TABLET | Refills: 3 | Status: SHIPPED | OUTPATIENT
Start: 2023-04-24 | End: 2024-05-08

## 2023-04-24 RX ORDER — TRAZODONE HYDROCHLORIDE 100 MG/1
100-200 TABLET ORAL AT BEDTIME
Qty: 180 TABLET | Refills: 3 | Status: SHIPPED | OUTPATIENT
Start: 2023-04-24 | End: 2024-05-08

## 2023-04-24 RX ORDER — ESTRADIOL 0.5 MG/1
0.5 TABLET ORAL DAILY
Qty: 90 TABLET | Refills: 3 | Status: SHIPPED | OUTPATIENT
Start: 2023-04-24 | End: 2024-05-08

## 2023-04-24 RX ORDER — MIRTAZAPINE 7.5 MG/1
7.5 TABLET, FILM COATED ORAL AT BEDTIME
Qty: 60 TABLET | Refills: 0 | Status: SHIPPED | OUTPATIENT
Start: 2023-04-24 | End: 2024-05-08

## 2023-04-24 RX ORDER — CLONAZEPAM 0.5 MG/1
0.5 TABLET ORAL 2 TIMES DAILY PRN
Qty: 60 TABLET | Refills: 0 | Status: SHIPPED | OUTPATIENT
Start: 2023-04-24 | End: 2024-05-08

## 2023-04-24 RX ORDER — SENNA AND DOCUSATE SODIUM 50; 8.6 MG/1; MG/1
1 TABLET, FILM COATED ORAL AT BEDTIME
Qty: 90 TABLET | Refills: 3 | Status: SHIPPED | OUTPATIENT
Start: 2023-04-24 | End: 2024-05-08

## 2023-04-24 RX ORDER — PROMETHAZINE HYDROCHLORIDE 25 MG/1
12.5-25 TABLET ORAL EVERY 6 HOURS PRN
Qty: 30 TABLET | Refills: 3 | Status: SHIPPED | OUTPATIENT
Start: 2023-04-24 | End: 2024-05-08

## 2023-04-24 ASSESSMENT — ENCOUNTER SYMPTOMS
FEVER: 0
ARTHRALGIAS: 0
HEMATOCHEZIA: 0
BREAST MASS: 0
JOINT SWELLING: 0
FREQUENCY: 0
CHILLS: 1
ABDOMINAL PAIN: 0
SHORTNESS OF BREATH: 0
HEMATURIA: 0
SORE THROAT: 0
DIARRHEA: 0
COUGH: 0
PALPITATIONS: 0
HEARTBURN: 0
PARESTHESIAS: 0
WEAKNESS: 0
DIZZINESS: 0
HEADACHES: 0
EYE PAIN: 0
DYSURIA: 0
MYALGIAS: 0
CONSTIPATION: 1
NAUSEA: 0
NERVOUS/ANXIOUS: 1

## 2023-04-24 ASSESSMENT — PATIENT HEALTH QUESTIONNAIRE - PHQ9
SUM OF ALL RESPONSES TO PHQ QUESTIONS 1-9: 6
SUM OF ALL RESPONSES TO PHQ QUESTIONS 1-9: 6
10. IF YOU CHECKED OFF ANY PROBLEMS, HOW DIFFICULT HAVE THESE PROBLEMS MADE IT FOR YOU TO DO YOUR WORK, TAKE CARE OF THINGS AT HOME, OR GET ALONG WITH OTHER PEOPLE: SOMEWHAT DIFFICULT

## 2023-04-24 ASSESSMENT — ANXIETY QUESTIONNAIRES
GAD7 TOTAL SCORE: 7
GAD7 TOTAL SCORE: 7
7. FEELING AFRAID AS IF SOMETHING AWFUL MIGHT HAPPEN: SEVERAL DAYS
2. NOT BEING ABLE TO STOP OR CONTROL WORRYING: SEVERAL DAYS
1. FEELING NERVOUS, ANXIOUS, OR ON EDGE: SEVERAL DAYS
GAD7 TOTAL SCORE: 7
3. WORRYING TOO MUCH ABOUT DIFFERENT THINGS: SEVERAL DAYS
IF YOU CHECKED OFF ANY PROBLEMS ON THIS QUESTIONNAIRE, HOW DIFFICULT HAVE THESE PROBLEMS MADE IT FOR YOU TO DO YOUR WORK, TAKE CARE OF THINGS AT HOME, OR GET ALONG WITH OTHER PEOPLE: SOMEWHAT DIFFICULT
6. BECOMING EASILY ANNOYED OR IRRITABLE: NEARLY EVERY DAY
5. BEING SO RESTLESS THAT IT IS HARD TO SIT STILL: NOT AT ALL
7. FEELING AFRAID AS IF SOMETHING AWFUL MIGHT HAPPEN: SEVERAL DAYS
4. TROUBLE RELAXING: NOT AT ALL
8. IF YOU CHECKED OFF ANY PROBLEMS, HOW DIFFICULT HAVE THESE MADE IT FOR YOU TO DO YOUR WORK, TAKE CARE OF THINGS AT HOME, OR GET ALONG WITH OTHER PEOPLE?: SOMEWHAT DIFFICULT

## 2023-04-24 NOTE — PROGRESS NOTES
SUBJECTIVE:   CC: Joellen is an 53 year old who presents for preventive health visit.        View : No data to display.            Patient has been advised of split billing requirements and indicates understanding: Yes  Healthy Habits:     Getting at least 3 servings of Calcium per day:  Yes    Bi-annual eye exam:  Yes    Dental care twice a year:  Yes    Sleep apnea or symptoms of sleep apnea:  None    Diet:  Regular (no restrictions)    Frequency of exercise:  2-3 days/week    Duration of exercise:  45-60 minutes    Taking medications regularly:  Yes    Medication side effects:  None    PHQ-2 Total Score: 2    Additional concerns today:  No      The 10-year ASCVD risk score (Shelbie JUNIOR, et al., 2019) is: 6.1%    Values used to calculate the score:      Age: 53 years      Sex: Female      Is Non- : No      Diabetic: No      Tobacco smoker: Yes      Systolic Blood Pressure: 130 mmHg      Is BP treated: No      HDL Cholesterol: 45 mg/dL      Total Cholesterol: 222 mg/dL       Depression and Anxiety Follow-Up    How are you doing with your depression since your last visit? No change    How are you doing with your anxiety since your last visit?  No change    Are you having other symptoms that might be associated with depression or anxiety? No    Have you had a significant life event? OTHER:       Do you have any concerns with your use of alcohol or other drugs? No    Social History     Tobacco Use     Smoking status: Every Day     Packs/day: 0.50     Years: 10.00     Pack years: 5.00     Types: Cigarettes     Smokeless tobacco: Never   Vaping Use     Vaping status: Never Used   Substance Use Topics     Alcohol use: Not Currently     Drug use: Yes     Types: Marijuana         12/23/2020     1:14 PM 2/15/2022     7:26 AM 4/24/2023     2:32 PM   PHQ   PHQ-9 Total Score 1 4 6   Q9: Thoughts of better off dead/self-harm past 2 weeks Not at all Not at all Not at all         12/23/2020     1:14 PM  2/15/2022     7:28 AM 4/24/2023     2:33 PM   CHON-7 SCORE   Total Score  4 (minimal anxiety) 7 (mild anxiety)   Total Score 5 4 7         4/24/2023     2:32 PM   Last PHQ-9   1.  Little interest or pleasure in doing things 2   2.  Feeling down, depressed, or hopeless 2   3.  Trouble falling or staying asleep, or sleeping too much 0   4.  Feeling tired or having little energy 1   5.  Poor appetite or overeating 0   6.  Feeling bad about yourself 1   7.  Trouble concentrating 0   8.  Moving slowly or restless 0   Q9: Thoughts of better off dead/self-harm past 2 weeks 0   PHQ-9 Total Score 6         4/24/2023     2:33 PM   CHON-7    1. Feeling nervous, anxious, or on edge 1   2. Not being able to stop or control worrying 1   3. Worrying too much about different things 1   4. Trouble relaxing 0   5. Being so restless that it is hard to sit still 0   6. Becoming easily annoyed or irritable 3   7. Feeling afraid, as if something awful might happen 1   CHON-7 Total Score 7   If you checked any problems, how difficult have they made it for you to do your work, take care of things at home, or get along with other people? Somewhat difficult       Suicide Assessment Five-step Evaluation and Treatment (SAFE-T)    Today's PHQ-2 Score:       4/24/2023     2:35 PM   PHQ-2 ( 1999 Pfizer)   Q1: Little interest or pleasure in doing things 1   Q2: Feeling down, depressed or hopeless 1   PHQ-2 Score 2   Q1: Little interest or pleasure in doing things Several days   Q2: Feeling down, depressed or hopeless Several days   PHQ-2 Score 2       Social History     Tobacco Use     Smoking status: Every Day     Packs/day: 0.50     Years: 10.00     Pack years: 5.00     Types: Cigarettes     Smokeless tobacco: Never   Vaping Use     Vaping status: Never Used   Substance Use Topics     Alcohol use: Not Currently           4/24/2023     2:35 PM   Alcohol Use   Prescreen: >3 drinks/day or >7 drinks/week? No     Reviewed orders with patient.  Reviewed  health maintenance and updated orders accordingly - Yes  Labs reviewed in EPIC    Breast Cancer Screenin/15/2022     7:33 AM 2023     2:35 PM   Breast CA Risk Assessment (FHS-7)   Do you have a family history of breast, colon, or ovarian cancer? Yes No / Unknown       Mammogram Screening: Recommended annual mammography  Pertinent mammograms are reviewed under the imaging tab.    History of abnormal Pap smear: YES - updated in Problem List and Health Maintenance accordingly      Latest Ref Rng & Units 2/15/2022     7:47 AM 2020    12:20 PM 2020    12:13 PM   PAP / HPV   PAP  Negative for Intraepithelial Lesion or Malignancy (NILM)       PAP (Historical)    NIL     HPV 16 DNA Negative Negative   Positive      HPV 18 DNA Negative Negative   Negative      Other HR HPV Negative Negative   Negative        Reviewed and updated as needed this visit by clinical staff                  Reviewed and updated as needed this visit by Provider                 Past Medical History:   Diagnosis Date     Anxiety      Bipolar affective disorder (H)      Cervical high risk HPV (human papillomavirus) test positive 2020     Hyperlipidaemia      Hypertension      Ovarian cyst     right        Review of Systems   Constitutional: Positive for chills. Negative for fever.   HENT: Negative for congestion, ear pain, hearing loss and sore throat.    Eyes: Negative for pain and visual disturbance.   Respiratory: Negative for cough and shortness of breath.    Cardiovascular: Negative for chest pain, palpitations and peripheral edema.   Gastrointestinal: Positive for constipation. Negative for abdominal pain, diarrhea, heartburn, hematochezia and nausea.   Breasts:  Negative for tenderness, breast mass and discharge.   Genitourinary: Negative for dysuria, frequency, genital sores, hematuria, pelvic pain, urgency, vaginal bleeding and vaginal discharge.   Musculoskeletal: Negative for arthralgias, joint swelling and  myalgias.   Skin: Negative for rash.   Neurological: Negative for dizziness, weakness, headaches and paresthesias.   Psychiatric/Behavioral: Positive for mood changes. The patient is nervous/anxious.         OBJECTIVE:   LMP 07/20/2008 (Exact Date)   Physical Exam  GENERAL APPEARANCE: healthy, alert and no distress  EYES: Eyes grossly normal to inspection, PERRL and conjunctivae and sclerae normal  HENT: ear canals and TM's normal, nose and mouth without ulcers or lesions, oropharynx clear and oral mucous membranes moist  NECK: no adenopathy, no asymmetry, masses, or scars and thyroid normal to palpation  RESP: lungs clear to auscultation - no rales, rhonchi or wheezes  BREAST: normal without masses, tenderness or nipple discharge and no palpable axillary masses or adenopathy  CV: regular rates and rhythm, normal S1 S2, no S3 or S4 and no murmur, click or rub  ABDOMEN: soft, nontender, no hepatosplenomegaly, no masses and bowel sounds normal   (female): normal female external genitalia, normal urethral meatus, vaginal mucosal atrophy noted, cervical stenosis  MS: no musculoskeletal defects are noted and gait is age appropriate without ataxia  SKIN: no suspicious lesions or rashes  NEURO: Normal strength and tone, sensory exam grossly normal, mentation intact and speech normal  PSYCH: mentation appears normal and affect normal/bright    ASSESSMENT/PLAN:       ICD-10-CM    1. Routine general medical examination at a health care facility  Z00.00 PAP screen with HPV - recommended age 30 - 65 years      2. Menopausal syndrome (hot flashes)  N95.1 estradiol (ESTRACE) 0.5 MG tablet      3. Nausea  R11.0 promethazine (PHENERGAN) 25 MG tablet      4. Bipolar affective disorder, remission status unspecified (H)  F31.9 clonazePAM (KLONOPIN) 0.5 MG tablet     sertraline (ZOLOFT) 100 MG tablet      5. Anxiety and depression  F41.9 sertraline (ZOLOFT) 100 MG tablet    F32.A mirtazapine (REMERON) 7.5 MG tablet      6. Adjustment  insomnia  F51.02 traZODone (DESYREL) 100 MG tablet      7. Hypothyroidism due to acquired atrophy of thyroid  E03.4       8. Chronic idiopathic constipation  K59.04 SENNA-docusate sodium (SENNA S) 8.6-50 MG tablet          1) Screenings discussed. She declines breast and colon cancer screenings    2,3) Meds renewed, no changes    4-6) PHQ and CHON scores mildly elevated and patient would like to add something to her Zoloft. Will trial a low dose of mirtazepine 7.5mg at bedtime. She'll let me know how things are going in 1 month.     7) Declined to have a TSH rechecked today    8) Will trial senna/docusate once daily      COUNSELING:  Reviewed preventive health counseling, as reflected in patient instructions      She reports that she has been smoking cigarettes. She has a 5.00 pack-year smoking history. She has never used smokeless tobacco.    [facility]

## 2023-04-27 LAB
BKR LAB AP GYN ADEQUACY: NORMAL
BKR LAB AP GYN INTERPRETATION: NORMAL
BKR LAB AP HPV REFLEX: NORMAL
BKR LAB AP PREVIOUS ABNL DX: NORMAL
BKR LAB AP PREVIOUS ABNORMAL: NORMAL
PATH REPORT.COMMENTS IMP SPEC: NORMAL
PATH REPORT.COMMENTS IMP SPEC: NORMAL
PATH REPORT.RELEVANT HX SPEC: NORMAL

## 2023-04-28 ENCOUNTER — PATIENT OUTREACH (OUTPATIENT)
Dept: FAMILY MEDICINE | Facility: CLINIC | Age: 53
End: 2023-04-28
Payer: COMMERCIAL

## 2023-04-28 LAB
HUMAN PAPILLOMA VIRUS 16 DNA: POSITIVE
HUMAN PAPILLOMA VIRUS 18 DNA: NEGATIVE
HUMAN PAPILLOMA VIRUS FINAL DIAGNOSIS: ABNORMAL
HUMAN PAPILLOMA VIRUS OTHER HR: NEGATIVE

## 2023-10-18 ENCOUNTER — TELEPHONE (OUTPATIENT)
Dept: FAMILY MEDICINE | Facility: CLINIC | Age: 53
End: 2023-10-18
Payer: COMMERCIAL

## 2023-10-18 NOTE — TELEPHONE ENCOUNTER
Patient Quality Outreach    Patient is due for the following:   Colon Cancer Screening  Breast Cancer Screening - Mammogram    Next Steps:   Patient declined follow up at this time.    Type of outreach:    Phone, spoke to patient/parent. patient      Questions for provider review:    None           Sbuhash Stokes CMA  Chart routed to Provider.

## 2024-03-25 ENCOUNTER — PATIENT OUTREACH (OUTPATIENT)
Dept: CARE COORDINATION | Facility: CLINIC | Age: 54
End: 2024-03-25
Payer: COMMERCIAL

## 2024-04-08 ENCOUNTER — PATIENT OUTREACH (OUTPATIENT)
Dept: FAMILY MEDICINE | Facility: CLINIC | Age: 54
End: 2024-04-08
Payer: COMMERCIAL

## 2024-04-08 ENCOUNTER — PATIENT OUTREACH (OUTPATIENT)
Dept: CARE COORDINATION | Facility: CLINIC | Age: 54
End: 2024-04-08
Payer: COMMERCIAL

## 2024-04-08 DIAGNOSIS — R87.810 CERVICAL HIGH RISK HPV (HUMAN PAPILLOMAVIRUS) TEST POSITIVE: Primary | ICD-10-CM

## 2024-04-08 NOTE — LETTER
April 8, 2024      Joellen Rihcter  [address] [address]        Dear ,    This letter is to remind you that you are due for your follow-up Pap smear and Human Papillomavirus (HPV) test.    Please call [phone] to schedule your appointment at your earliest convenience.    If you have completed the appointment outside of the [facility] system, please have the records forwarded to our office. We will update your chart for your provider to review before your next annual wellness visit.     Thank you for choosing [facility]!      Sincerely,    Your [facility] Care Team

## 2024-05-06 ENCOUNTER — TELEPHONE (OUTPATIENT)
Dept: FAMILY MEDICINE | Facility: CLINIC | Age: 54
End: 2024-05-06
Payer: COMMERCIAL

## 2024-05-06 NOTE — TELEPHONE ENCOUNTER
RN called patient and relayed providers message. Patient verbalized understanding. Patient stated she does not need any labs completed and is ok with a virtual appointment. RN scheduled patient for 5/8/24 at 2 pm for telephone appointment with PCP for med check.       Hortencia Sabillon RN on 5/6/2024 at 12:30 PM

## 2024-05-06 NOTE — TELEPHONE ENCOUNTER
"I see an additional phone encounter, patient want to be seen for routine annual visit between today and this Friday as she is moving out of [place] on Saturday.    Routed to PCP, can we offer a same day or other hold spot for this on PCP or any other [facility] provider's schedule?    She is looking to refill \"all\" her meds per the other phone encounter in Epic.    Katie ORTA RN  [facility] Triage    "

## 2024-05-06 NOTE — TELEPHONE ENCOUNTER
Patient due for Pap and HPV.    Reminder done today via telephone call.    Pt notified. She is moving to [place] on 5/11. She will call to see if there are any appts available this week.

## 2024-05-06 NOTE — TELEPHONE ENCOUNTER
Reason for Call:  Appointment Request    Patient requesting this type of appt:  Preventive     Requested provider: Preferably Matthew, Nikki Lugo or any other provider at [place] that is female would be okay too    Reason patient unable to be scheduled: Needs to be scheduled by clinic    When does patient want to be seen/preferred time:  Between 5/6/24 and  5/10/24 as patient is moving on Saturday out of state    Comments: requesting work in before she moves out of state    Okay to leave a detailed message?: Yes at Cell number on file:    Telephone Information:   Mobile [phone]       Call taken on 5/6/2024 at 9:43 AM by Haley Fernandes

## 2024-05-06 NOTE — TELEPHONE ENCOUNTER
Medication Question or Refill        What medication are you calling about (include dose and sig)?: all medications     Preferred Pharmacy:     [facility] [place], [place] - [address] [address]  [address] [address] [address]  Phone: [phone] Fax: [phone]      Controlled Substance Agreement on file:   CSA -- Patient Level:    CSA: None found at the patient level.       Who prescribed the medication?: PCP    Do you need a refill? Yes    When did you use the medication last? Daily/ongoing    Patient offered an appointment? Yes: no appointments before patient moves, requesting refill    Do you have any questions or concerns?  No      Okay to leave a detailed message?: Yes at Cell number on file:    Telephone Information:   Mobile [phone]

## 2024-05-06 NOTE — TELEPHONE ENCOUNTER
Ok to do a med check virtually unless she wants to do blood work such as thyroid and cholesterol.   If she doesn't want to do labs then virtual is ok  If in person, ok to use a same day on Wed

## 2024-05-08 ENCOUNTER — VIRTUAL VISIT (OUTPATIENT)
Dept: FAMILY MEDICINE | Facility: CLINIC | Age: 54
End: 2024-05-08
Payer: COMMERCIAL

## 2024-05-08 DIAGNOSIS — F41.9 ANXIETY AND DEPRESSION: ICD-10-CM

## 2024-05-08 DIAGNOSIS — F41.9 ANXIETY: ICD-10-CM

## 2024-05-08 DIAGNOSIS — F31.9 BIPOLAR AFFECTIVE DISORDER, REMISSION STATUS UNSPECIFIED (H): Primary | ICD-10-CM

## 2024-05-08 DIAGNOSIS — F51.02 ADJUSTMENT INSOMNIA: ICD-10-CM

## 2024-05-08 DIAGNOSIS — F32.A ANXIETY AND DEPRESSION: ICD-10-CM

## 2024-05-08 PROCEDURE — 99442 PR PHYSICIAN TELEPHONE EVALUATION 11-20 MIN: CPT | Mod: 93 | Performed by: PHYSICIAN ASSISTANT

## 2024-05-08 RX ORDER — SERTRALINE HYDROCHLORIDE 100 MG/1
200 TABLET, FILM COATED ORAL DAILY
Qty: 180 TABLET | Refills: 3 | Status: CANCELLED | OUTPATIENT
Start: 2024-05-08

## 2024-05-08 RX ORDER — VENLAFAXINE HYDROCHLORIDE 75 MG/1
225 CAPSULE, EXTENDED RELEASE ORAL DAILY
Qty: 270 CAPSULE | Refills: 3 | Status: SHIPPED | OUTPATIENT
Start: 2024-05-08

## 2024-05-08 RX ORDER — TRAZODONE HYDROCHLORIDE 100 MG/1
100-200 TABLET ORAL AT BEDTIME
Qty: 180 TABLET | Refills: 3 | Status: SHIPPED | OUTPATIENT
Start: 2024-05-08

## 2024-05-08 RX ORDER — CLONAZEPAM 0.5 MG/1
0.5 TABLET ORAL 2 TIMES DAILY PRN
Qty: 60 TABLET | Refills: 0 | Status: SHIPPED | OUTPATIENT
Start: 2024-05-08

## 2024-05-08 ASSESSMENT — ANXIETY QUESTIONNAIRES
2. NOT BEING ABLE TO STOP OR CONTROL WORRYING: SEVERAL DAYS
3. WORRYING TOO MUCH ABOUT DIFFERENT THINGS: NOT AT ALL
GAD7 TOTAL SCORE: 4
IF YOU CHECKED OFF ANY PROBLEMS ON THIS QUESTIONNAIRE, HOW DIFFICULT HAVE THESE PROBLEMS MADE IT FOR YOU TO DO YOUR WORK, TAKE CARE OF THINGS AT HOME, OR GET ALONG WITH OTHER PEOPLE: NOT DIFFICULT AT ALL
GAD7 TOTAL SCORE: 4
1. FEELING NERVOUS, ANXIOUS, OR ON EDGE: SEVERAL DAYS
7. FEELING AFRAID AS IF SOMETHING AWFUL MIGHT HAPPEN: NOT AT ALL
6. BECOMING EASILY ANNOYED OR IRRITABLE: MORE THAN HALF THE DAYS
8. IF YOU CHECKED OFF ANY PROBLEMS, HOW DIFFICULT HAVE THESE MADE IT FOR YOU TO DO YOUR WORK, TAKE CARE OF THINGS AT HOME, OR GET ALONG WITH OTHER PEOPLE?: NOT DIFFICULT AT ALL
7. FEELING AFRAID AS IF SOMETHING AWFUL MIGHT HAPPEN: NOT AT ALL
GAD7 TOTAL SCORE: 4
5. BEING SO RESTLESS THAT IT IS HARD TO SIT STILL: NOT AT ALL
4. TROUBLE RELAXING: NOT AT ALL

## 2024-05-08 ASSESSMENT — PATIENT HEALTH QUESTIONNAIRE - PHQ9
10. IF YOU CHECKED OFF ANY PROBLEMS, HOW DIFFICULT HAVE THESE PROBLEMS MADE IT FOR YOU TO DO YOUR WORK, TAKE CARE OF THINGS AT HOME, OR GET ALONG WITH OTHER PEOPLE: NOT DIFFICULT AT ALL
SUM OF ALL RESPONSES TO PHQ QUESTIONS 1-9: 3
SUM OF ALL RESPONSES TO PHQ QUESTIONS 1-9: 3

## 2024-05-08 NOTE — PROGRESS NOTES
Joellen is a 54 year old who is being evaluated via a billable telephone visit.    What phone number would you like to be contacted at? [phone]  How would you like to obtain your AVS? Not needed  Originating Location (pt. Location): Home    Distant Location (provider location):  On-site    Assessment & Plan       ICD-10-CM    1. Bipolar affective disorder, remission status unspecified (H)  F31.9 clonazePAM (KLONOPIN) 0.5 MG tablet      2. Anxiety and depression  F41.9 venlafaxine (EFFEXOR XR) 75 MG 24 hr capsule    F32.A       3. Anxiety  F41.9       4. Adjustment insomnia  F51.02 traZODone (DESYREL) 100 MG tablet          1-3) Anxiety has been worse. She would like to switch back to Effexor. We discussed how to cross taper from Zoloft to Effexor. Follow up in 1 month if needed.  reviewed, no concerns.    4) Trazodone renewed, no changes    Patient Instructions   Cross taper from Zoloft to Effexor:  Decrease your Zoloft to 1 tab daily x1 week then stop it all together.  At the same time, start Effexor by taking 1 cap daily x1 week then increase to 3 caps daily after that.      Prescription drug management    Nicotine/Tobacco Cessation  She reports that she has been smoking cigarettes. She has a 5 pack-year smoking history. She has never used smokeless tobacco.    Return in about 1 month (around 6/8/2024), or if symptoms worsen or fail to improve.        Subjective   Joellen is a 54 year old, presenting for the following health issues:  Recheck Medication    History of Present Illness       Mental Health Follow-up:  Patient presents to follow-up on Depression & Anxiety.Patient's depression since last visit has been:  No change  The patient is not having other symptoms associated with depression.  Patient's anxiety since last visit has been:  No change  The patient is not having other symptoms associated with anxiety.  Any significant life events: No  Patient is not feeling anxious or having panic  attacks.  Patient has no concerns about alcohol or drug use.    Hyperlipidemia:  She presents for follow up of hyperlipidemia.   She is not taking medication to lower cholesterol. She is not having myalgia or other side effects to statin medications.    Reason for visit:  Medication recheck    She eats 0-1 servings of fruits and vegetables daily.She consumes 0 sweetened beverage(s) daily.She exercises with enough effort to increase her heart rate 30 to 60 minutes per day.  She exercises with enough effort to increase her heart rate 3 or less days per week.   She is taking medications regularly.     Wants to switch back to Effexor - it worked better        Review of Systems  Constitutional, and psychiatric systems are negative, except as otherwise noted.      Objective           Vitals:  No vitals were obtained today due to virtual visit.    Physical Exam   General: Alert and no distress //Respiratory: No audible wheeze, cough, or shortness of breath // Psychiatric:  Appropriate affect, tone, and pace of words          Phone call duration: 11 minutes  Signed Electronically by: Nikki Castro PA-C

## 2024-05-08 NOTE — PATIENT INSTRUCTIONS
Cross taper from Zoloft to Effexor:  Decrease your Zoloft to 1 tab daily x1 week then stop it all together.  At the same time, start Effexor by taking 1 cap daily x1 week then increase to 3 caps daily after that.

## 2024-06-06 NOTE — TELEPHONE ENCOUNTER
FYI to provider - Patient is lost to pap tracking follow-up. Attempts to contact pt have been made per reminder process and there has been no reply and/or no appt scheduled. Contact hx listed below.     2008, 2011, 2015 NIL paps.  11/27/20 NIL pap, +HR HPV 16. Epes due by 2/27/21.   7/9/21 Lost to follow-up for pap tracking   2/15/22 NIL pap, neg HPV. Plan: cotest in 1 year  4/24/23 NIL pap, +HR HPV 16. Plan: colposcopy due before 7/24/23 5/1/23 Pt notified. She declines colp and wouldn't provide a reason. Per provider, repeat cotest in 6-12 months  4/8/24 Reminder letter  5/6/24 Reminder call -- Pt notified. She is moving to [place] on 5/11. She will call to see if there are any appts available this week.  6/6/24 Lost to follow-up for pap tracking     Sofia Albright RN BSN, Pap Tracking